# Patient Record
Sex: FEMALE | Race: WHITE | NOT HISPANIC OR LATINO | Employment: OTHER | URBAN - METROPOLITAN AREA
[De-identification: names, ages, dates, MRNs, and addresses within clinical notes are randomized per-mention and may not be internally consistent; named-entity substitution may affect disease eponyms.]

---

## 2017-01-31 DIAGNOSIS — F33.1 MAJOR DEPRESSIVE DISORDER, RECURRENT EPISODE, MODERATE (H): Primary | ICD-10-CM

## 2017-01-31 NOTE — TELEPHONE ENCOUNTER
Bupropion XL 150mg       Last Written Prescription Date: 12/07/2016 #90 x 1  Last filled 01/04/2017    Last Office Visit with [facility], [facility] or  Health prescribing provider:  03/22/2016 WANDY Castro   Next 5 appointments (look out 90 days)     Mar 27, 2017 10:00 AM   SHORT with GUSTAVO Santiago CNP   [facility] ([facility])    [address] [address] [phone]                   Last PHQ-9 score on record=   PHQ-9 SCORE 10/9/2016   Total Score -   Total Score MyChart 4 (Minimal depression)   Total Score -       No results found for this basename: ast  No results found for this basename: alt

## 2017-02-01 RX ORDER — BUPROPION HYDROCHLORIDE 150 MG/1
450 TABLET ORAL EVERY MORNING
Qty: 90 TABLET | Refills: 1 | Status: SHIPPED | OUTPATIENT
Start: 2017-02-01 | End: 2017-03-30

## 2017-02-01 NOTE — TELEPHONE ENCOUNTER
Prescription approved per [facility] Refill Protocol or patient Primary care provider (PCP)  MEDARDO Jose RN/Nic Calvin

## 2017-02-06 ENCOUNTER — TELEPHONE (OUTPATIENT)
Dept: FAMILY MEDICINE | Facility: CLINIC | Age: 62
End: 2017-02-06

## 2017-03-27 ENCOUNTER — OFFICE VISIT (OUTPATIENT)
Dept: FAMILY MEDICINE | Facility: CLINIC | Age: 62
End: 2017-03-27
Payer: COMMERCIAL

## 2017-03-27 VITALS
HEART RATE: 72 BPM | DIASTOLIC BLOOD PRESSURE: 80 MMHG | BODY MASS INDEX: 29.7 KG/M2 | TEMPERATURE: 98.3 F | SYSTOLIC BLOOD PRESSURE: 128 MMHG | HEIGHT: 66 IN | WEIGHT: 184.8 LBS

## 2017-03-27 DIAGNOSIS — Z01.419 ENCOUNTER FOR GYNECOLOGICAL EXAMINATION WITHOUT ABNORMAL FINDING: Primary | ICD-10-CM

## 2017-03-27 DIAGNOSIS — Z12.11 COLON CANCER SCREENING: ICD-10-CM

## 2017-03-27 DIAGNOSIS — Z11.59 NEED FOR HEPATITIS C SCREENING TEST: ICD-10-CM

## 2017-03-27 DIAGNOSIS — E55.9 VITAMIN D DEFICIENCY: ICD-10-CM

## 2017-03-27 DIAGNOSIS — E03.4 HYPOTHYROIDISM DUE TO ACQUIRED ATROPHY OF THYROID: ICD-10-CM

## 2017-03-27 DIAGNOSIS — D50.8 OTHER IRON DEFICIENCY ANEMIA: ICD-10-CM

## 2017-03-27 DIAGNOSIS — Z13.6 CARDIOVASCULAR SCREENING; LDL GOAL LESS THAN 160: ICD-10-CM

## 2017-03-27 LAB
ANION GAP SERPL CALCULATED.3IONS-SCNC: 4 MMOL/L (ref 3–14)
BUN SERPL-MCNC: 20 MG/DL (ref 7–30)
CALCIUM SERPL-MCNC: 9.2 MG/DL (ref 8.5–10.1)
CHLORIDE SERPL-SCNC: 104 MMOL/L (ref 94–109)
CHOLEST SERPL-MCNC: 251 MG/DL
CO2 SERPL-SCNC: 29 MMOL/L (ref 20–32)
CREAT SERPL-MCNC: 0.93 MG/DL (ref 0.52–1.04)
FERRITIN SERPL-MCNC: 65 NG/ML (ref 8–252)
GFR SERPL CREATININE-BSD FRML MDRD: 61 ML/MIN/1.7M2
GLUCOSE SERPL-MCNC: 72 MG/DL (ref 70–99)
HDLC SERPL-MCNC: 82 MG/DL
LDLC SERPL CALC-MCNC: 151 MG/DL
NONHDLC SERPL-MCNC: 169 MG/DL
POTASSIUM SERPL-SCNC: 4.5 MMOL/L (ref 3.4–5.3)
SODIUM SERPL-SCNC: 137 MMOL/L (ref 133–144)
TRIGL SERPL-MCNC: 89 MG/DL
TSH SERPL DL<=0.05 MIU/L-ACNC: 2.62 MU/L (ref 0.4–4)

## 2017-03-27 PROCEDURE — 36415 COLL VENOUS BLD VENIPUNCTURE: CPT | Performed by: NURSE PRACTITIONER

## 2017-03-27 PROCEDURE — 84443 ASSAY THYROID STIM HORMONE: CPT | Performed by: NURSE PRACTITIONER

## 2017-03-27 PROCEDURE — 99396 PREV VISIT EST AGE 40-64: CPT | Performed by: NURSE PRACTITIONER

## 2017-03-27 PROCEDURE — 82274 ASSAY TEST FOR BLOOD FECAL: CPT | Performed by: NURSE PRACTITIONER

## 2017-03-27 PROCEDURE — 87624 HPV HI-RISK TYP POOLED RSLT: CPT | Performed by: NURSE PRACTITIONER

## 2017-03-27 PROCEDURE — G0145 SCR C/V CYTO,THINLAYER,RESCR: HCPCS | Performed by: NURSE PRACTITIONER

## 2017-03-27 PROCEDURE — 80048 BASIC METABOLIC PNL TOTAL CA: CPT | Performed by: NURSE PRACTITIONER

## 2017-03-27 PROCEDURE — 80061 LIPID PANEL: CPT | Performed by: NURSE PRACTITIONER

## 2017-03-27 PROCEDURE — 82728 ASSAY OF FERRITIN: CPT | Performed by: NURSE PRACTITIONER

## 2017-03-27 RX ORDER — LEVOTHYROXINE SODIUM 88 UG/1
88 TABLET ORAL DAILY
Qty: 90 TABLET | Refills: 3 | Status: SHIPPED | OUTPATIENT
Start: 2017-03-27 | End: 2018-03-22

## 2017-03-27 NOTE — NURSING NOTE
"Chief Complaint   Patient presents with     Physical       Initial /80 (BP Location: Left arm, Patient Position: Chair, Cuff Size: Adult Regular)  Pulse 72  Temp 98.3  F (36.8  C) (Tympanic)  Ht 5' 5.5\" (1.664 m)  Wt 184 lb 12.8 oz (83.8 kg)  BMI 30.28 kg/m2 Estimated body mass index is 30.28 kg/(m^2) as calculated from the following:    Height as of this encounter: 5' 5.5\" (1.664 m).    Weight as of this encounter: 184 lb 12.8 oz (83.8 kg).  Medication Reconciliation: complete     Christie Mckinnon CMA (AAMA)      "

## 2017-03-27 NOTE — MR AVS SNAPSHOT
After Visit Summary   3/27/2017    Enid Sexton    MRN: [id number]           Patient Information     Date Of Birth          1955        Visit Information        Provider Department      3/27/2017 10:00 AM Yuli Castro APRN [facility]        Today's Diagnoses     Encounter for gynecological examination without abnormal finding    -  1    CARDIOVASCULAR SCREENING; LDL GOAL LESS THAN 160        Colon cancer screening        Need for hepatitis C screening test        Vitamin D deficiency        Other iron deficiency anemia        Hypothyroidism due to acquired atrophy of thyroid          Care Instructions      Preventive Health Recommendations  Female Ages 50 - 64    Yearly exam: See your health care provider every year in order to  o Review health changes.   o Discuss preventive care.    o Review your medicines if your doctor has prescribed any.      Get a Pap test every three years (unless you have an abnormal result and your provider advises testing more often).    If you get Pap tests with HPV test, you only need to test every 5 years, unless you have an abnormal result.     You do not need a Pap test if your uterus was removed (hysterectomy) and you have not had cancer.    You should be tested each year for STDs (sexually transmitted diseases) if you're at risk.     Have a mammogram every 1 to 2 years.    Have a colonoscopy at age 50, or have a yearly FIT test (stool test). These exams screen for colon cancer.      Have a cholesterol test every 5 years, or more often if advised.    Have a diabetes test (fasting glucose) every three years. If you are at risk for diabetes, you should have this test more often.     If you are at risk for osteoporosis (brittle bone disease), think about having a bone density scan (DEXA).    Shots: Get a flu shot each year. Get a tetanus shot every 10 years.    Nutrition:     Eat at least 5 servings of fruits and vegetables each  day.    Eat whole-grain bread, whole-wheat pasta and brown rice instead of white grains and rice.    Talk to your provider about Calcium and Vitamin D.     Lifestyle    Exercise at least 150 minutes a week (30 minutes a day, 5 days a week). This will help you control your weight and prevent disease.    Limit alcohol to one drink per day.    No smoking.     Wear sunscreen to prevent skin cancer.     See your dentist every six months for an exam and cleaning.    See your eye doctor every 1 to 2 years.      Do the knee exercises given    wear the knee brace during the day and off at bed time   Get some over the counter  Asper Cream and massage in the knee 2-4 times per day     Stay well hydrated - urine should be clear.      Start an exercise program .      No change with medication               Follow-ups after your visit        Future tests that were ordered for you today     Open Future Orders        Priority Expected Expires Ordered    Fecal colorectal cancer screen FIT Routine 4/17/2017 6/19/2017 3/27/2017    **Hepatitis C Screen Reflex to RNA FUTURE anytime Routine 3/27/2017 3/27/2018 3/27/2017            Who to contact     Normal or non-critical lab and imaging results will be communicated to you by [employer], letter or phone within 4 business days after the clinic has received the results. If you do not hear from us within 7 days, please contact the clinic through [employer] or phone. If you have a critical or abnormal lab result, we will notify you by phone as soon as possible.  Submit refill requests through [employer] or call your pharmacy and they will forward the refill request to us. Please allow 3 business days for your refill to be completed.          If you need to speak with a  for additional information , please call: [phone]           Additional Information About Your Visit        [employer] Information     [employer] gives you secure access to your electronic health record. If you see a  "primary care provider, you can also send messages to your care team and make appointments. If you have questions, please call your primary care clinic.  If you do not have a primary care provider, please call [phone] and they will assist you.        Care EveryWhere ID     This is your Care EveryWhere ID. This could be used by other organizations to access your [place] medical records  [id number]        Your Vitals Were     Pulse Temperature Height BMI (Body Mass Index)          72 98.3  F (36.8  C) (Tympanic) 5' 5.5\" (1.664 m) 30.28 kg/m2         Blood Pressure from Last 3 Encounters:   03/27/17 128/80   03/22/16 120/78   04/30/15 144/86    Weight from Last 3 Encounters:   03/27/17 184 lb 12.8 oz (83.8 kg)   04/06/16 171 lb (77.6 kg)   03/22/16 171 lb (77.6 kg)              We Performed the Following     Basic metabolic panel  (Ca, Cl, CO2, Creat, Gluc, K, Na, BUN)     Ferritin     HPV High Risk Types DNA Cervical     LIPID REFLEX TO DIRECT LDL PANEL     Pap imaged thin layer screen with HPV - recommended age 30 - 65     TSH        Primary Care Provider Office Phone # Fax #    GUSTAVO Santiago [facility] [phone]       [facility] [address] [place] [address]        Thank you!     Thank you for choosing [facility]  for your care. Our goal is always to provide you with excellent care. Hearing back from our patients is one way we can continue to improve our services. Please take a few minutes to complete the written survey that you may receive in the mail after your visit with us. Thank you!             Your Updated Medication List - Protect others around you: Learn how to safely use, store and throw away your medicines at www.disposemymeds.org.          This list is accurate as of: 3/27/17 10:46 AM.  Always use your most recent med list.                   Brand Name Dispense Instructions for use    aspirin 325 MG tablet      Take by mouth daily       " buPROPion 150 MG 24 hr tablet    WELLBUTRIN XL    90 tablet    Take 3 tablets (450 mg) by mouth every morning DUE for yearly office visit March 2017 for further refill plz make appt       CALCIUM CITRATE + D PO      Take by mouth as needed 2 times per week       citalopram 40 MG tablet    celeXA    90 tablet    Take 1 tablet (40 mg) by mouth daily Needs to be seen  Before next refill       DAILY MULTIVITAMIN PO      Take by mouth daily Reported on 3/27/2017       IRON PO      Take by mouth daily       levothyroxine 88 MCG tablet    SYNTHROID/LEVOTHROID    90 tablet    Take 1 tablet (88 mcg) by mouth daily       vitamin B complex with vitamin C Tabs tablet      Take 1 tablet by mouth daily

## 2017-03-27 NOTE — LETTER
My Depression Action Plan  Name: Enid Sexton   Date of Birth 1955  Date: 3/27/2017    My doctor: Yuli Castro   My clinic: [address] [id number] [phone]          GREEN    ZONE   Good Control    What it looks like:     Things are going generally well. You have normal up s and down s. You may even feel depressed from time to time, but bad moods usually last less than a day.   What you need to do:  1. Continue to care for yourself (see self care plan)  2. Check your depression survival kit and update it as needed  3. Follow your physician s recommendations including any medication.  4. Do not stop taking medication unless you consult with your physician first.           YELLOW         ZONE Getting Worse    What it looks like:     Depression is starting to interfere with your life.     It may be hard to get out of bed; you may be starting to isolate yourself from others.    Symptoms of depression are starting to last most all day and this has happened for several days.     You may have suicidal thoughts but they are not constant.   What you need to do:     1. Call your care team, your response to treatment will improve if you keep your care team informed of your progress. Yellow periods are signs an adjustment may need to be made.     2. Continue your self-care, even if you have to fake it!    3. Talk to someone in your support network    4. Open up your depression survival kit           RED    ZONE Medical Alert - Get Help    What it looks like:     Depression is seriously interfering with your life.     You may experience these or other symptoms: You can t get out of bed most days, can t work or engage in other necessary activities, you have trouble taking care of basic hygiene, or basic responsibilities, thoughts of suicide or death that will not go away, self-injurious behavior.     What you need to do:  1. Call your care team  and request a same-day appointment. If they are not available (weekends or after hours) call your local crisis line, emergency room or 911.      Electronically signed by: Christie Mckinnon, March 27, 2017    Depression Self Care Plan / Survival Kit    Self-Care for Depression  Here s the deal. Your body and mind are really not as separate as most people think.  What you do and think affects how you feel and how you feel influences what you do and think. This means if you do things that people who feel good do, it will help you feel better.  Sometimes this is all it takes.  There is also a place for medication and therapy depending on how severe your depression is, so be sure to consult with your medical provider and/ or Behavioral Health Consultant if your symptoms are worsening or not improving.     In order to better manage my stress, I will:    Exercise  Get some form of exercise, every day. This will help reduce pain and release endorphins, the  feel good  chemicals in your brain. This is almost as good as taking antidepressants!  This is not the same as joining a gym and then never going! (they count on that by the way ) It can be as simple as just going for a walk or doing some gardening, anything that will get you moving.      Hygiene   Maintain good hygiene (Get out of bed in the morning, Make your bed, Brush your teeth, Take a shower, and Get dressed like you were going to work, even if you are unemployed).  If your clothes don't fit try to get ones that do.    Diet  I will strive to eat foods that are good for me, drink plenty of water, and avoid excessive sugar, caffeine, alcohol, and other mood-altering substances.  Some foods that are helpful in depression are: complex carbohydrates, B vitamins, flaxseed, fish or fish oil, fresh fruits and vegetables.    Psychotherapy  I agree to participate in Individual Therapy (if recommended).    Medication  If prescribed medications, I agree to take them.  Missing  doses can result in serious side effects.  I understand that drinking alcohol, or other illicit drug use, may cause potential side effects.  I will not stop my medication abruptly without first discussing it with my provider.    Staying Connected With Others  I will stay in touch with my friends, family members, and my primary care provider/team.    Use your imagination  Be creative.  We all have a creative side; it doesn t matter if it s oil painting, sand castles, or mud pies! This will also kick up the endorphins.    Witness Beauty  (AKA stop and smell the roses) Take a look outside, even in mid-winter. Notice colors, textures. Watch the squirrels and birds.     Service to others  Be of service to others.  There is always someone else in need.  By helping others we can  get out of ourselves  and remember the really important things.  This also provides opportunities for practicing all the other parts of the program.    Humor  Laugh and be silly!  Adjust your TV habits for less news and crime-drama and more comedy.    Control your stress  Try breathing deep, massage therapy, biofeedback, and meditation. Find time to relax each day.     My support system    Clinic Contact:  Phone number:    Contact 1:  Phone number:    Contact 2:  Phone number:    [place]/:  Phone number:    Therapist:  Phone number:    Local crisis center:    Phone number:    Other community support:  Phone number:

## 2017-03-27 NOTE — PROGRESS NOTES
SUBJECTIVE:     CC: Enid Sexton is an 61 year old woman who presents for preventive health visit.     Answers for HPI/ROS submitted by the patient on 3/24/2017   Annual Exam:  Getting at least 3 servings of Calcium per day:: NO  Bi-annual eye exam:: NO  Dental care twice a year:: NO  Sleep apnea or symptoms of sleep apnea:: Daytime drowsiness  Diet:: Regular (no restrictions)  Frequency of exercise:: None  Taking medications regularly:: Yes  Medication side effects:: None  Q1: Little interest or pleasure in doing things: 1=Several days  Q2: Feeling down, depressed or hopeless: 0=Not at all  PHQ-2 Score: 1    *Is not fasting.     *Patient declining all cancer screenings besides pap.    *Left knee pain - Was carrying a bookcase about 3 weeks ago and has had pain since. Feels like the pain is not going away and that the knee is getting stiffer. Has tried an ace wrap and this did not help much. States that when the pain first started it would get better throughout the day but now the pain lasts.    The outer part of the knee is better but the inner knee     Today's PHQ-2 Score:   PHQ-2 ( 1999 [employer]) 3/24/2017 3/20/2016   Q1: Little interest or pleasure in doing things - -   Q2: Feeling down, depressed or hopeless - -   PHQ-2 Score - -   Little interest or pleasure in doing things Several days Several days   Feeling down, depressed or hopeless Not at all Several days   PHQ-2 Score 1 2       Abuse: Current or Past(Physical, Sexual or Emotional)- No  Do you feel safe in your environment - Yes    Social History   Substance Use Topics     Smoking status: Former Smoker     Smokeless tobacco: Never Used     Alcohol use No      Comment: rare     The patient does not drink >3 drinks per day nor >7 drinks per week.    Recent Labs   Lab Test  03/22/16   1041  03/05/15   1210  02/27/14   0928   CHOL  279*  227*  252*   HDL  92  78  76   LDL  170*  122  160*   TRIG  86  137  80   CHOLHDLRATIO   --   2.9  3.0   NHDL  187*   --     --        Reviewed orders with patient.  Reviewed health maintenance and updated orders accordingly - Yes    Mammo Decision Support:  Patient over age 50, mutual decision to screen reflected in health maintenance.    Pertinent mammograms are reviewed under the imaging tab.  History of abnormal Pap smear: NO - age 30- 65 PAP every 3 years recommended    Reviewed and updated as needed this visit by clinical staff  Tobacco  Allergies  Meds  Med Hx  Surg Hx  Fam Hx  Soc Hx        Reviewed and updated as needed this visit by Provider        Is trying to find full time work, is now cleaning houses for  4 people   Left knee pain - no known injury    Does have a black eye - right eye- bent over and hit her face on a chair.      ROS:  C: NEGATIVE for fever, chills, POSITIVE change in weight - weight gain   I: NEGATIVE for worrisome rashes, moles or lesions  E: NEGATIVE for vision changes or irritation  EYES: NEGATIVE for vision changes or irritation and current with eye exam , does have right black eye after bending over   ENT: NEGATIVE for ear, mouth and throat problems  R: NEGATIVE for significant cough or SOB  B: NEGATIVE for masses, tenderness or discharge  CV: NEGATIVE for chest pain, palpitations or peripheral edema  GI: NEGATIVE for nausea, abdominal pain, heartburn, or change in bowel habits  : NEGATIVE for unusual urinary or vaginal symptoms. No vaginal bleeding.  MUSCULOSKELETAL:NEGATIVE for significant arthralgias or myalgia and POSITIVE  for joint pain left knee pain after trying to move a bookcase   N: NEGATIVE for weakness, dizziness or paresthesias  E: NEGATIVE for temperature intolerance, skin/hair changes  H: NEGATIVE for bleeding problems  PSYCHIATRIC: NEGATIVE for changes in mood or affect     Problem list, Medication list, Allergies, and Medical/Social/Surgical histories reviewed in [facility] and updated as appropriate.  Labs reviewed in EPIC  BP Readings from Last 3 Encounters:   03/27/17 128/80    03/22/16 120/78   04/30/15 144/86    Wt Readings from Last 3 Encounters:   03/27/17 184 lb 12.8 oz (83.8 kg)   04/06/16 171 lb (77.6 kg)   03/22/16 171 lb (77.6 kg)                  Patient Active Problem List   Diagnosis     CARDIOVASCULAR SCREENING; LDL GOAL LESS THAN 160     Moderate major depression (H)     Hypothyroidism     Vitamin D deficiency     Wrist pain     Advanced directives, counseling/discussion     Iron deficiency anemia     Ganglion cyst     Past Surgical History:   Procedure Laterality Date     ABDOMEN SURGERY  laperoscopy    exploratory     BIOPSY  10 years?    after pap smear - was nothing there     EYE SURGERY  lasik surgery     lapraoscopy         Social History   Substance Use Topics     Smoking status: Former Smoker     Smokeless tobacco: Never Used     Alcohol use No      Comment: rare     Family History   Problem Relation Age of Onset     Hypertension Mother      Other Cancer Mother      CANCER Father      bladder cancer/smoker     Other Cancer Father      Depression Niece          Current Outpatient Prescriptions   Medication Sig Dispense Refill     vitamin B complex with vitamin C (VITAMIN  B COMPLEX) TABS tablet Take 1 tablet by mouth daily       buPROPion (WELLBUTRIN XL) 150 MG 24 hr tablet Take 3 tablets (450 mg) by mouth every morning DUE for yearly office visit March 2017 for further refill plz make appt 90 tablet 1     citalopram (CELEXA) 40 MG tablet Take 1 tablet (40 mg) by mouth daily Needs to be seen  Before next refill 90 tablet 1     levothyroxine (SYNTHROID, LEVOTHROID) 88 MCG tablet Take 1 tablet (88 mcg) by mouth daily 90 tablet 3     aspirin 325 MG tablet Take by mouth daily       Multiple Vitamin (DAILY MULTIVITAMIN PO) Take by mouth daily Reported on 3/27/2017       IRON PO Take by mouth daily       Calcium Citrate-Vitamin D (CALCIUM CITRATE + D PO) Take by mouth as needed 2 times per week       No Known Allergies  OBJECTIVE:     /80 (BP Location: Left arm,  "Patient Position: Chair, Cuff Size: Adult Regular)  Pulse 72  Temp 98.3  F (36.8  C) (Tympanic)  Ht 5' 5.5\" (1.664 m)  Wt 184 lb 12.8 oz (83.8 kg)  BMI 30.28 kg/m2  EXAM:  GENERAL APPEARANCE: healthy, alert and no distress  EYES: Eyes grossly normal to inspection, PERRL and conjunctivae and sclerae normal  HENT: ear canals and TM's normal, nose and mouth without ulcers or lesions, oropharynx clear and oral mucous membranes moist  NECK: no adenopathy, no asymmetry, masses, or scars and thyroid normal to palpation  RESP: lungs clear to auscultation - no rales, rhonchi or wheezes  BREAST: normal without masses, tenderness or nipple discharge and no palpable axillary masses or adenopathy  CV: regular rate and rhythm, normal S1 S2, no S3 or S4, no murmur, click or rub, no peripheral edema and peripheral pulses strong  ABDOMEN: soft, nontender, no hepatosplenomegaly, no masses and bowel sounds normal   (female): normal female external genitalia, normal urethral meatus, vaginal mucosal atrophy noted, normal cervix, adnexae, and uterus without masses or abnormal discharge  MS: gait is age appropriate without ataxia, left knee   Inspection: AP/lateral alignment normal, small effusion, No quad atrophy  Tender: MCL  Non-tender: lateral patellar facet, medial patellar facet, inferior pole patella, patella tendon, quadriceps insertion, LCL, lateral joint line, medial joint line, medial tibial plateau, lateral tibial plateau  Active Range of Motion: decreased flexion  60 degrees, pain with flexion, full extension, no pain with extension  Strength: quad  5-/5, Hamstrings  5-/5 and Gastroc  5-/5  Special tests: negative Lachman's test, negative posterior drawer  SKIN: no suspicious lesions or rashes  NEURO: Normal strength and tone, sensory exam grossly normal, mentation intact and speech normal  PSYCH: mentation appears normal and affect normal/bright    ASSESSMENT/PLAN:       ASSESSMENT/PLAN:      ICD-10-CM    1. Encounter " for gynecological examination without abnormal finding Z01.419 Pap imaged thin layer screen with HPV - recommended age 30 - 65     HPV High Risk Types DNA Cervical   2. CARDIOVASCULAR SCREENING; LDL GOAL LESS THAN 160 Z13.6 LIPID REFLEX TO DIRECT LDL PANEL     Basic metabolic panel  (Ca, Cl, CO2, Creat, Gluc, K, Na, BUN)   3. Colon cancer screening Z12.11 Fecal colorectal cancer screen FIT   4. Need for hepatitis C screening test Z11.59 **Hepatitis C Screen Reflex to RNA FUTURE anytime   5. Vitamin D deficiency E55.9    6. Other iron deficiency anemia D50.8 Ferritin   7. Hypothyroidism due to acquired atrophy of thyroid E03.4 TSH       Patient Instructions     Preventive Health Recommendations  Female Ages 50 - 64    Yearly exam: See your health care provider every year in order to  o Review health changes.   o Discuss preventive care.    o Review your medicines if your doctor has prescribed any.      Get a Pap test every three years (unless you have an abnormal result and your provider advises testing more often).    If you get Pap tests with HPV test, you only need to test every 5 years, unless you have an abnormal result.     You do not need a Pap test if your uterus was removed (hysterectomy) and you have not had cancer.    You should be tested each year for STDs (sexually transmitted diseases) if you're at risk.     Have a mammogram every 1 to 2 years.    Have a colonoscopy at age 50, or have a yearly FIT test (stool test). These exams screen for colon cancer.      Have a cholesterol test every 5 years, or more often if advised.    Have a diabetes test (fasting glucose) every three years. If you are at risk for diabetes, you should have this test more often.     If you are at risk for osteoporosis (brittle bone disease), think about having a bone density scan (DEXA).    Shots: Get a flu shot each year. Get a tetanus shot every 10 years.    Nutrition:     Eat at least 5 servings of fruits and vegetables each  "day.    Eat whole-grain bread, whole-wheat pasta and brown rice instead of white grains and rice.    Talk to your provider about Calcium and Vitamin D.     Lifestyle    Exercise at least 150 minutes a week (30 minutes a day, 5 days a week). This will help you control your weight and prevent disease.    Limit alcohol to one drink per day.    No smoking.     Wear sunscreen to prevent skin cancer.     See your dentist every six months for an exam and cleaning.    See your eye doctor every 1 to 2 years.      Do the knee exercises given    wear the knee brace during the day and off at bed time   Get some over the counter  Asper Cream and massage in the knee 2-4 times per day     Stay well hydrated - urine should be clear.      Start an exercise program .      No change with medication                   COUNSELING:   Reviewed preventive health counseling, as reflected in patient instructions       Regular exercise       Healthy diet/nutrition       Vision screening       Osteoporosis Prevention/Bone Health       Colon cancer screening       Consider Hep C screening for patients born between 1945 and 1965       Advance Care Planning         reports that she has quit smoking. She has never used smokeless tobacco.    Estimated body mass index is 30.28 kg/(m^2) as calculated from the following:    Height as of this encounter: 5' 5.5\" (1.664 m).    Weight as of this encounter: 184 lb 12.8 oz (83.8 kg).   Weight management plan: Discussed healthy diet and exercise guidelines and patient will follow up in 6 months in clinic to re-evaluate.    Counseling Resources:  ATP IV Guidelines  Pooled Cohorts Equation Calculator  Breast Cancer Risk Calculator  FRAX Risk Assessment  ICSI Preventive Guidelines  Dietary Guidelines for Americans, 2010  USDA's MyPlate  ASA Prophylaxis  Lung CA Screening    ORQUIDEA STOLL NP, APRN CNP  [facility]"

## 2017-03-27 NOTE — PATIENT INSTRUCTIONS
Preventive Health Recommendations  Female Ages 50 - 64    Yearly exam: See your health care provider every year in order to  o Review health changes.   o Discuss preventive care.    o Review your medicines if your doctor has prescribed any.      Get a Pap test every three years (unless you have an abnormal result and your provider advises testing more often).    If you get Pap tests with HPV test, you only need to test every 5 years, unless you have an abnormal result.     You do not need a Pap test if your uterus was removed (hysterectomy) and you have not had cancer.    You should be tested each year for STDs (sexually transmitted diseases) if you're at risk.     Have a mammogram every 1 to 2 years.    Have a colonoscopy at age 50, or have a yearly FIT test (stool test). These exams screen for colon cancer.      Have a cholesterol test every 5 years, or more often if advised.    Have a diabetes test (fasting glucose) every three years. If you are at risk for diabetes, you should have this test more often.     If you are at risk for osteoporosis (brittle bone disease), think about having a bone density scan (DEXA).    Shots: Get a flu shot each year. Get a tetanus shot every 10 years.    Nutrition:     Eat at least 5 servings of fruits and vegetables each day.    Eat whole-grain bread, whole-wheat pasta and brown rice instead of white grains and rice.    Talk to your provider about Calcium and Vitamin D.     Lifestyle    Exercise at least 150 minutes a week (30 minutes a day, 5 days a week). This will help you control your weight and prevent disease.    Limit alcohol to one drink per day.    No smoking.     Wear sunscreen to prevent skin cancer.     See your dentist every six months for an exam and cleaning.    See your eye doctor every 1 to 2 years.      Do the knee exercises given    wear the knee brace during the day and off at bed time   Get some over the counter  Asper Cream and massage in the knee 2-4 times  per day     Stay well hydrated - urine should be clear.      Start an exercise program .      No change with medication

## 2017-03-28 ASSESSMENT — PATIENT HEALTH QUESTIONNAIRE - PHQ9: SUM OF ALL RESPONSES TO PHQ QUESTIONS 1-9: 2

## 2017-03-29 LAB
COPATH REPORT: NORMAL
PAP: NORMAL

## 2017-03-30 ENCOUNTER — TELEPHONE (OUTPATIENT)
Dept: FAMILY MEDICINE | Facility: CLINIC | Age: 62
End: 2017-03-30

## 2017-03-30 DIAGNOSIS — R19.5 POSITIVE FIT (FECAL IMMUNOCHEMICAL TEST): Primary | ICD-10-CM

## 2017-03-30 DIAGNOSIS — Z12.11 COLON CANCER SCREENING: ICD-10-CM

## 2017-03-30 DIAGNOSIS — F33.1 MAJOR DEPRESSIVE DISORDER, RECURRENT EPISODE, MODERATE (H): ICD-10-CM

## 2017-03-30 LAB
FINAL DIAGNOSIS: NORMAL
HEMOCCULT STL QL IA: POSITIVE
HPV HR 12 DNA CVX QL NAA+PROBE: NEGATIVE
HPV16 DNA SPEC QL NAA+PROBE: NEGATIVE
HPV18 DNA SPEC QL NAA+PROBE: NEGATIVE
SPECIMEN DESCRIPTION: NORMAL

## 2017-03-30 NOTE — TELEPHONE ENCOUNTER
Wellbutrin XL 150mg       Last Written Prescription Date: 02/01/2017  #90 x 1  Last filled 02/28/2017  Last Office Visit with [facility], [facility] or [facility] prescribing provider:  03/29/2017 WANDY Castro        Last PHQ-9 score on record=   PHQ-9 SCORE 3/27/2017   Total Score -   Total Score MyChart -   Total Score 2       No results found for: AST  No results found for: ALT

## 2017-03-31 RX ORDER — BUPROPION HYDROCHLORIDE 150 MG/1
450 TABLET ORAL EVERY MORNING
Qty: 270 TABLET | Refills: 1 | Status: SHIPPED | OUTPATIENT
Start: 2017-03-31 | End: 2017-06-26

## 2017-03-31 NOTE — TELEPHONE ENCOUNTER
Please call pt that her FIT test did come back positive and I did put in an order for a colonoscopy .  I did also send her a mychart note with a copy of the referral to GI  Thank you  Yuli

## 2017-03-31 NOTE — TELEPHONE ENCOUNTER
Called patient and informed her of positive FIT test results and Yuli's recommendations to follow up with a colonoscopy. Did provide patient with the number to schedule the procedure in [place]. It looks like a referral was placed for consult ONLY, order for colonoscopy placed.  Christie Mckinnon CMA ([facility])

## 2017-04-25 ENCOUNTER — RADIANT APPOINTMENT (OUTPATIENT)
Dept: GENERAL RADIOLOGY | Facility: CLINIC | Age: 62
End: 2017-04-25
Attending: NURSE PRACTITIONER
Payer: COMMERCIAL

## 2017-04-25 ENCOUNTER — OFFICE VISIT (OUTPATIENT)
Dept: FAMILY MEDICINE | Facility: CLINIC | Age: 62
End: 2017-04-25
Payer: COMMERCIAL

## 2017-04-25 VITALS
TEMPERATURE: 98.6 F | HEART RATE: 72 BPM | DIASTOLIC BLOOD PRESSURE: 80 MMHG | WEIGHT: 186.2 LBS | HEIGHT: 66 IN | SYSTOLIC BLOOD PRESSURE: 118 MMHG | BODY MASS INDEX: 29.92 KG/M2

## 2017-04-25 DIAGNOSIS — G89.29 CHRONIC PAIN OF LEFT KNEE: ICD-10-CM

## 2017-04-25 DIAGNOSIS — M25.562 CHRONIC PAIN OF LEFT KNEE: Primary | ICD-10-CM

## 2017-04-25 DIAGNOSIS — M25.562 CHRONIC PAIN OF LEFT KNEE: ICD-10-CM

## 2017-04-25 DIAGNOSIS — G89.29 CHRONIC PAIN OF LEFT KNEE: Primary | ICD-10-CM

## 2017-04-25 PROCEDURE — 73560 X-RAY EXAM OF KNEE 1 OR 2: CPT | Mod: LT

## 2017-04-25 PROCEDURE — 99213 OFFICE O/P EST LOW 20 MIN: CPT | Performed by: NURSE PRACTITIONER

## 2017-04-25 RX ORDER — PREDNISONE 10 MG/1
TABLET ORAL
Qty: 30 TABLET | Refills: 0 | Status: SHIPPED | OUTPATIENT
Start: 2017-04-25 | End: 2018-03-22

## 2017-04-25 ASSESSMENT — PAIN SCALES - GENERAL: PAINLEVEL: MODERATE PAIN (5)

## 2017-04-25 NOTE — MR AVS SNAPSHOT
After Visit Summary   4/25/2017    Enid Sexton    MRN: [id number]           Patient Information     Date Of Birth          1955        Visit Information        Provider Department      4/25/2017 10:40 AM Yuli Castro APRN [facility]        Today's Diagnoses     Chronic pain of left knee    -  1      Care Instructions    Continue to ice your left knee   Massage in  Asper Cream     Go to Physical therapy for the knee pain      Take the prednisone to decrease the swelling         Follow-ups after your visit        Additional Services     [facility] PT, HAND, AND CHIROPRACTIC REFERRAL       **This order will print in the [facility] Scheduling Office**    Physical Therapy, Hand Therapy and Chiropractic Care are available through:    *[facility]  *[facility]  *[facility]    Call one number to schedule at any of the above locations: [phone].    Your provider has referred you to: Physical Therapy at [facility] or [facility]    Indication/Reason for Referral: left knee pain   Onset of Illness: 6 + weeks ago   Therapy Orders: Evaluate and Treat  Special Programs: None  Special Request: None    Lanre De Dios      Additional Comments for the Therapist or Chiropractor:     Please be aware that coverage of these services is subject to the terms and limitations of your health insurance plan.  Call member services at your health plan with any benefit or coverage questions.      Please bring the following to your appointment:    *Your personal calendar for scheduling future appointments  *Comfortable clothing                  Who to contact     Normal or non-critical lab and imaging results will be communicated to you by MyChart, letter or phone within 4 business days after the clinic has received the results. If you do not hear from us within 7 days, please contact the clinic through MyChart or phone. If you have a critical or abnormal lab  "result, we will notify you by phone as soon as possible.  Submit refill requests through [employer] or call your pharmacy and they will forward the refill request to us. Please allow 3 business days for your refill to be completed.          If you need to speak with a  for additional information , please call: [phone]           Additional Information About Your Visit        [employer] Information     [employer] gives you secure access to your electronic health record. If you see a primary care provider, you can also send messages to your care team and make appointments. If you have questions, please call your primary care clinic.  If you do not have a primary care provider, please call [phone] and they will assist you.        Care EveryWhere ID     This is your Care EveryWhere ID. This could be used by other organizations to access your [facility] medical records  [id number]        Your Vitals Were     Pulse Temperature Height Breastfeeding? BMI (Body Mass Index)       72 98.6  F (37  C) (Tympanic) 5' 5.5\" (1.664 m) No 30.51 kg/m2        Blood Pressure from Last 3 Encounters:   04/25/17 118/80   03/27/17 128/80   03/22/16 120/78    Weight from Last 3 Encounters:   04/25/17 186 lb 3.2 oz (84.5 kg)   03/27/17 184 lb 12.8 oz (83.8 kg)   04/06/16 171 lb (77.6 kg)              We Performed the Following     [facility] PT, HAND, AND CHIROPRACTIC REFERRAL          Today's Medication Changes          These changes are accurate as of: 4/25/17 12:24 PM.  If you have any questions, ask your nurse or doctor.               Start taking these medicines.        Dose/Directions    predniSONE 10 MG tablet   Commonly known as:  DELTASONE   Used for:  Chronic pain of left knee   Started by:  Yuli Castro APRN CNP        40 mg for 3 days,   30 mg for 3 days ,  20 mg for 3 days , 10 mg for 3 days.   Quantity:  30 tablet   Refills:  0            Where to get your medicines      These medications were sent to [id number] - [place] [[facility] [place] - [address]  [address]  [place] [address]     Phone:  [phone]     predniSONE 10 MG tablet                Primary Care Provider Office Phone # Fax #    Yuli Castro APRALLYSON [facility] [phone]       [facility] [address] [place] [address]        Thank you!     Thank you for choosing [facility]  for your care. Our goal is always to provide you with excellent care. Hearing back from our patients is one way we can continue to improve our services. Please take a few minutes to complete the written survey that you may receive in the mail after your visit with us. Thank you!             Your Updated Medication List - Protect others around you: Learn how to safely use, store and throw away your medicines at www.disposemymeds.org.          This list is accurate as of: 4/25/17 12:24 PM.  Always use your most recent med list.                   Brand Name Dispense Instructions for use    aspirin 325 MG tablet      Take by mouth daily       buPROPion 150 MG 24 hr tablet    WELLBUTRIN XL    270 tablet    Take 3 tablets (450 mg) by mouth every morning       CALCIUM CITRATE + D PO      Take by mouth as needed 2 times per week       citalopram 40 MG tablet    celeXA    90 tablet    Take 1 tablet (40 mg) by mouth daily Needs to be seen  Before next refill       DAILY MULTIVITAMIN PO      Take by mouth daily Reported on 3/27/2017       IRON PO      Take by mouth daily       levothyroxine 88 MCG tablet    SYNTHROID/LEVOTHROID    90 tablet    Take 1 tablet (88 mcg) by mouth daily       predniSONE 10 MG tablet    DELTASONE    30 tablet    40 mg for 3 days,   30 mg for 3 days ,  20 mg for 3 days , 10 mg for 3 days.       vitamin B complex with vitamin C Tabs tablet      Take 1 tablet by mouth daily

## 2017-04-25 NOTE — PATIENT INSTRUCTIONS
Continue to ice your left knee   Massage in  Asper Cream     Go to Physical therapy for the knee pain      Take the prednisone to decrease the swelling

## 2017-04-25 NOTE — NURSING NOTE
"Chief Complaint   Patient presents with     RECHECK       Initial /80  Pulse 72  Temp 98.6  F (37  C) (Tympanic)  Ht 5' 5.5\" (1.664 m)  Wt 186 lb 3.2 oz (84.5 kg)  Breastfeeding? No  BMI 30.51 kg/m2 Estimated body mass index is 30.51 kg/(m^2) as calculated from the following:    Height as of this encounter: 5' 5.5\" (1.664 m).    Weight as of this encounter: 186 lb 3.2 oz (84.5 kg).  Medication Reconciliation: complete     Taya Doe CMA      "

## 2017-04-25 NOTE — PROGRESS NOTES
SUBJECTIVE:                                                    Enid Sexton is a 61 year old female who presents to clinic today for the following health issues:      Joint Pain     Onset: left knee pain      Description:   Location: left knee  Character: Sharp    Intensity: moderate    Progression of Symptoms: worse    Accompanying Signs & Symptoms:  Other symptoms: warmth and swelling   History:   Previous similar pain: No       Precipitating factors:   Trauma or overuse: YES- moving a bookcase down stairs     Alleviating factors:  Improved by: rest/inactivity, ice, stretching,         Therapies Tried and outcome: Asper creme, ice and exercises.         Left  knee pain     Problem list and histories reviewed & adjusted, as indicated.  Additional history: as documented    Patient Active Problem List   Diagnosis     CARDIOVASCULAR SCREENING; LDL GOAL LESS THAN 160     Hypothyroidism     Vitamin D deficiency     Wrist pain     Advanced directives, counseling/discussion     Iron deficiency anemia     Ganglion cyst     Positive FIT (fecal immunochemical test)     Past Surgical History:   Procedure Laterality Date     ABDOMEN SURGERY  laperoscopy    exploratory     BIOPSY  10 years?    after pap smear - was nothing there     EYE SURGERY  lasik surgery     lapraoscopy         Social History   Substance Use Topics     Smoking status: Former Smoker     Smokeless tobacco: Never Used     Alcohol use No      Comment: rare     Family History   Problem Relation Age of Onset     Hypertension Mother      Other Cancer Mother      CANCER Father      bladder cancer/smoker     Other Cancer Father      Depression Niece          Current Outpatient Prescriptions   Medication Sig Dispense Refill     buPROPion (WELLBUTRIN XL) 150 MG 24 hr tablet Take 3 tablets (450 mg) by mouth every morning 270 tablet 1     vitamin B complex with vitamin C (VITAMIN  B COMPLEX) TABS tablet Take 1 tablet by mouth daily       levothyroxine  "(SYNTHROID/LEVOTHROID) 88 MCG tablet Take 1 tablet (88 mcg) by mouth daily 90 tablet 3     citalopram (CELEXA) 40 MG tablet Take 1 tablet (40 mg) by mouth daily Needs to be seen  Before next refill 90 tablet 1     aspirin 325 MG tablet Take by mouth daily       Multiple Vitamin (DAILY MULTIVITAMIN PO) Take by mouth daily Reported on 3/27/2017       IRON PO Take by mouth daily       Calcium Citrate-Vitamin D (CALCIUM CITRATE + D PO) Take by mouth as needed 2 times per week       No Known Allergies  BP Readings from Last 3 Encounters:   04/25/17 118/80   03/27/17 128/80   03/22/16 120/78    Wt Readings from Last 3 Encounters:   04/25/17 186 lb 3.2 oz (84.5 kg)   03/27/17 184 lb 12.8 oz (83.8 kg)   04/06/16 171 lb (77.6 kg)                    Reviewed and updated as needed this visit by clinical staff  Allergies       Reviewed and updated as needed this visit by Provider         ROS:  C: NEGATIVE for fever, chills, change in weight  E/M: NEGATIVE for ear, mouth and throat problems  R: NEGATIVE for significant cough or SOB  CV: NEGATIVE for chest pain, palpitations or peripheral edema  MUSCULOSKELETAL: POSITIVE  for joint pain left knee pain.  Will hurt with walking   The pain started to after moving a very heavy bookcase and was pushing with her  Left knee   Has been doing exercises but that seems to be making the knee hurt worse.   Ice will help the knee when the ice is on . Once it is off the knee will hurt and over time the knee pain seems to be getting sharper         OBJECTIVE:                                                    /80  Pulse 72  Temp 98.6  F (37  C) (Tympanic)  Ht 5' 5.5\" (1.664 m)  Wt 186 lb 3.2 oz (84.5 kg)  Breastfeeding? No  BMI 30.51 kg/m2  Body mass index is 30.51 kg/(m^2).  GENERAL: healthy, alert and no distress  RESP: lungs clear to auscultation - no rales, rhonchi or wheezes  CV: regular rate and rhythm, normal S1 S2, no S3 or S4, no murmur, click or rub, no peripheral edema " "and peripheral pulses strong  MS: Inspection: AP/lateral alignment normal, No effusion, No quad atrophy  Tender: MCL, medial joint line  Non-tender: patella tendon, quadriceps insertion, LCL, lateral joint line, medial tibial plateau, lateral tibial plateau, distal IT band, prepatellar bursa  Active Range of Motion: decreased flexion  80 degrees, pain with flexion, no pain with extension  Strength: quad  5-/5, Hamstrings  5-/5 and Gastroc  5-/5  Special tests: positive Lachman's test  Doesn't want to do PT  Doesn't want a cortisone injection . Was requesting an antibiotic ( read that it could be bursitis and that antibiotics will correct that  ) did review with her that it is more inflammation       Diagnostic Test Results:  Knee x-ray  Looks good      ASSESSMENT/PLAN:                                                      ASSESSMENT/PLAN:      ICD-10-CM    1. Chronic pain of left knee M25.562 XR Knee Left 1/2 Views    G89.29 [facility], AND CHIROPRACTIC REFERRAL     predniSONE (DELTASONE) 10 MG tablet       Patient Instructions   Continue to ice your left knee   Massage in  Asper Cream     Go to Physical therapy for the knee pain      Take the prednisone to decrease the swelling              BMI:   Estimated body mass index is 30.51 kg/(m^2) as calculated from the following:    Height as of this encounter: 5' 5.5\" (1.664 m).    Weight as of this encounter: 186 lb 3.2 oz (84.5 kg).   Weight management plan: Discussed healthy diet and exercise guidelines and patient will follow up in 6 months in clinic to re-evaluate.      MEDICATIONS:        - Start taking Prednisone        - Continue other medications without change  See Patient Instructions    ORQUIDEA STOLL NP, APRN [facility]    "

## 2017-05-26 ENCOUNTER — TELEPHONE (OUTPATIENT)
Dept: FAMILY MEDICINE | Facility: CLINIC | Age: 62
End: 2017-05-26

## 2017-05-26 DIAGNOSIS — F33.1 MAJOR DEPRESSIVE DISORDER, RECURRENT EPISODE, MODERATE (H): ICD-10-CM

## 2017-05-26 RX ORDER — CITALOPRAM HYDROBROMIDE 40 MG/1
40 TABLET ORAL DAILY
Qty: 90 TABLET | Refills: 0 | Status: SHIPPED | OUTPATIENT
Start: 2017-05-26 | End: 2017-09-17

## 2017-05-26 NOTE — TELEPHONE ENCOUNTER
Citalopram  40mg     Last Written Prescription Date: 12/08/2016 #90 x 1  Last filled 04/26/2017  Last Office Visit with [facility] primary care provider:  04/25/2017 WANDY Castro        Last PHQ-9 score on record=   PHQ-9 SCORE 3/27/2017   Total Score -   Total Score MyChart -   Total Score 2

## 2017-05-26 NOTE — TELEPHONE ENCOUNTER
Received a rejection notice from [facility]    Re: buPROPion (WELLBUTRIN XL) 150 MG 24 hr tablet, Sig: Take 3 tablets (450 mg) by mouth every morning, Disp 270 x 1, Approved 03/31/2017 BENNIE NO    Notes on request: Please contact the insurance for a PA or send new Rx's for both the 150mg and 300mg tablets, thank you.    Let me know if we need to do the PA.    Al Purvis RT (r)  [facility]

## 2017-05-30 ENCOUNTER — TELEPHONE (OUTPATIENT)
Dept: FAMILY MEDICINE | Facility: CLINIC | Age: 62
End: 2017-05-30

## 2017-05-30 PROBLEM — F33.1 MAJOR DEPRESSIVE DISORDER, RECURRENT EPISODE, MODERATE (H): Status: ACTIVE | Noted: 2017-05-30

## 2017-05-30 RX ORDER — BUPROPION HYDROCHLORIDE 300 MG/1
300 TABLET ORAL EVERY MORNING
Qty: 90 TABLET | Refills: 1 | Status: SHIPPED | OUTPATIENT
Start: 2017-05-30 | End: 2017-06-26

## 2017-05-30 RX ORDER — BUPROPION HYDROCHLORIDE 150 MG/1
150 TABLET ORAL EVERY MORNING
Qty: 90 TABLET | Refills: 1 | Status: SHIPPED | OUTPATIENT
Start: 2017-05-30 | End: 2017-06-26

## 2017-05-30 NOTE — TELEPHONE ENCOUNTER
Pt called stating [facility] couldn't refill Buprion as insurance needs a quantity exception form submitted, nia has rejection notice from pharmacy and will start as soon as he can, will wait to hear from nia. Told pt to call back in a few days, she is asking for us to rush this if possible.     Tere Woodbeck  [facility] [place]

## 2017-05-30 NOTE — TELEPHONE ENCOUNTER
Follow up call to [facility], [phone], spoke with the pharmacist, they had just received the two prescriptions and no PA is needed.

## 2017-06-26 ENCOUNTER — TELEPHONE (OUTPATIENT)
Dept: FAMILY MEDICINE | Facility: CLINIC | Age: 62
End: 2017-06-26

## 2017-06-26 DIAGNOSIS — F33.1 MAJOR DEPRESSIVE DISORDER, RECURRENT EPISODE, MODERATE (H): ICD-10-CM

## 2017-06-26 RX ORDER — BUPROPION HYDROCHLORIDE 150 MG/1
450 TABLET ORAL EVERY MORNING
Qty: 90 TABLET | Refills: 0 | Status: SHIPPED | OUTPATIENT
Start: 2017-06-26 | End: 2017-07-23

## 2017-06-26 NOTE — TELEPHONE ENCOUNTER
Reason for Call:  Other call back    Detailed comments: She would like to go back on Bupropion 150 mg 3 tablets instead of the 150 mg tablet and 300 mg tablet.  Since starting the 300 mg tablets and the 150 mg tablets she is having a hard time getting out of bed.  She said that she will pay the 2 co-pays to go back on taking the 3 150 mg tablets per day.  Please advise.    Phone Number Patient can be reached at: Home number on file [phone] (home)    Best Time: any    Can we leave a detailed message on this number? YES    Call taken on 6/26/2017 at 11:22 AM by Nazia Simpson

## 2017-06-26 NOTE — TELEPHONE ENCOUNTER
I did change her prescription back to the  150 mg x 3 tablets. AND i did give her a referral to  Mental health to consult for medication to see if there is a better medication / combination of medication for her   Please mail her referral and also call her to let her know

## 2017-06-29 ENCOUNTER — TELEPHONE (OUTPATIENT)
Dept: FAMILY MEDICINE | Facility: CLINIC | Age: 62
End: 2017-06-29

## 2017-06-29 NOTE — TELEPHONE ENCOUNTER
Received response from [facility]    Response faxed to the Pharmacy        Al Purvis RT (r)  [facility]

## 2017-06-29 NOTE — TELEPHONE ENCOUNTER
Received PA request for Bupropion XL 150mg from [facility]  Pharmacy Rejection Note: 76 Plan Limitations Exceeded    Sig:Take 3 tablets (450 mg) by mouth every morning  Disp: 90 per 30  BENNIE: no    No previous PA on file for this med.    Dx: Major depressive disorder, recurrent episode, moderate (H) [F33.1]   Rationale: Tx of Major depressive disorder, recurrent episode, moderate (H) [F33.1]     Provided Ins: [employer]  Provided Ins ID: [id number]  Provided Ins Phone # [phone]    Unable to submit to [employer], called them [phone], indicated that she is a [employer] patient. Unable to submit to [employer]. Called [employer] [phone], initiated an over the phone request      Al REYES (r)  [facility]

## 2017-07-23 DIAGNOSIS — F33.1 MAJOR DEPRESSIVE DISORDER, RECURRENT EPISODE, MODERATE (H): ICD-10-CM

## 2017-07-24 NOTE — TELEPHONE ENCOUNTER
Bupropion ER 150mg       Last Written Prescription Date: 06/26/2017  #90 x 0  Last filled 06/24/2017  Last Office Visit with [facility], [facility] or [facility] prescribing provider:  04/25/2017 WANDY Castro        Last PHQ-9 score on record=   PHQ-9 SCORE 3/27/2017   Total Score -   Total Score MyChart -   Total Score 2       No results found for: AST  No results found for: ALT

## 2017-07-26 DIAGNOSIS — F33.1 MAJOR DEPRESSIVE DISORDER, RECURRENT EPISODE, MODERATE (H): ICD-10-CM

## 2017-07-26 RX ORDER — BUPROPION HYDROCHLORIDE 150 MG/1
TABLET ORAL
Qty: 90 TABLET | Refills: 0 | Status: SHIPPED | OUTPATIENT
Start: 2017-07-26 | End: 2018-03-22

## 2017-07-27 RX ORDER — BUPROPION HYDROCHLORIDE 150 MG/1
TABLET ORAL
Qty: 90 TABLET | Refills: 0 | Status: SHIPPED | OUTPATIENT
Start: 2017-07-27 | End: 2017-08-20

## 2017-07-27 NOTE — TELEPHONE ENCOUNTER
Wellbutrin XL 150mg       Last Written Prescription Date: 06/27/2017 #90 x 0  Last filled 06/24/2017  Last Office Visit with [facility], [facility] or [facility] prescribing provider:  04/25/2017 WANDY Castro    Approved 07/26/2017 on the 07/23/2017 encounter  TRANSMISSION FAILED        Last PHQ-9 score on record=   PHQ-9 SCORE 3/27/2017   Total Score -   Total Score MyChart -   Total Score 2       No results found for: AST  No results found for: ALT

## 2017-08-20 DIAGNOSIS — F33.1 MAJOR DEPRESSIVE DISORDER, RECURRENT EPISODE, MODERATE (H): ICD-10-CM

## 2017-08-21 RX ORDER — BUPROPION HYDROCHLORIDE 150 MG/1
TABLET ORAL
Qty: 270 TABLET | Refills: 1 | Status: SHIPPED | OUTPATIENT
Start: 2017-08-21 | End: 2018-02-13

## 2017-08-21 NOTE — TELEPHONE ENCOUNTER
Bupropion XL 150mg       Last Written Prescription Date: 07/27/2017 #90 x 0  Last filled 07/27/2017  Last Office Visit with [facility], [facility] or [facility] prescribing provider:  04/25/2017 WANDY Castro        Last PHQ-9 score on record=   PHQ-9 SCORE 7/29/2017   Total Score -   Total Score MyChart 4 (Minimal depression)   Total Score 4       No results found for: AST  No results found for: ALT

## 2017-09-17 DIAGNOSIS — F33.1 MAJOR DEPRESSIVE DISORDER, RECURRENT EPISODE, MODERATE (H): ICD-10-CM

## 2017-09-18 NOTE — TELEPHONE ENCOUNTER
Citalopram 40mg     Last Written Prescription Date: 05/26/2017 #90 x 0  Last filled 08/20/2017   Last Office Visit with [facility] primary care provider:  04/25/2017  WANDY Castro        Last PHQ-9 score on record=   PHQ-9 SCORE 7/29/2017   Total Score -   Total Score MyChart 4 (Minimal depression)   Total Score 4

## 2017-09-19 RX ORDER — CITALOPRAM HYDROBROMIDE 40 MG/1
TABLET ORAL
Qty: 90 TABLET | Refills: 1 | Status: SHIPPED | OUTPATIENT
Start: 2017-09-19 | End: 2018-03-09

## 2017-09-19 NOTE — TELEPHONE ENCOUNTER
Prescription approved per [facility] [place].  Ignacia Carbtree RN

## 2017-09-20 ENCOUNTER — TELEPHONE (OUTPATIENT)
Dept: FAMILY MEDICINE | Facility: CLINIC | Age: 62
End: 2017-09-20

## 2017-09-20 NOTE — TELEPHONE ENCOUNTER
Pt is calling and states that her insurance wont allow her to get 90 pills at a time, she has to get it for Qty of 30 pills and she is asking if we can give her 6 refills so she is able to just call her pharmacy and get it and not have to call us.     Yessica Lin, Station

## 2017-09-20 NOTE — TELEPHONE ENCOUNTER
Called [facility] they are aware and have informed pt she has 6 months of meds but can only fill 30 days at a time due to insurance   Pt informed   MEDARDO Shukla  Clinic  RN/Nic Calvin

## 2017-12-06 ENCOUNTER — TELEPHONE (OUTPATIENT)
Dept: FAMILY MEDICINE | Facility: CLINIC | Age: 62
End: 2017-12-06

## 2017-12-06 NOTE — TELEPHONE ENCOUNTER
The Patient declined Preventive Health Screens for: MAMMOGRAM  Please review chart and follow-up with patient if needed.    Thank you

## 2017-12-26 ENCOUNTER — MYC MEDICAL ADVICE (OUTPATIENT)
Dept: FAMILY MEDICINE | Facility: CLINIC | Age: 62
End: 2017-12-26

## 2018-01-04 NOTE — TELEPHONE ENCOUNTER
Called patient informed her she still has a refill will call if she needs anything else.    Nic Dye [facility]

## 2018-02-13 DIAGNOSIS — F33.1 MAJOR DEPRESSIVE DISORDER, RECURRENT EPISODE, MODERATE (H): ICD-10-CM

## 2018-02-14 NOTE — TELEPHONE ENCOUNTER
"Requested Prescriptions   Pending Prescriptions Disp Refills     buPROPion (WELLBUTRIN XL) 150 MG 24 hr tablet [Pharmacy Med Name: BuPROPion HCl ER (XL) Oral Tablet Extended Release 24 Hour 150 MG] 90 tablet 0    Last Written Prescription Date:  8/21/17  Last Fill Quantity: 270,  # refills: 1   Last office visit: 4/25/2017 with prescribing provider:  4/25/17   Future Office Visit:     Sig: TAKE THREE TABLETS BY MOUTH IN THE MORNING    SSRIs Protocol Failed    2/13/2018  7:26 PM       Failed - PHQ-9 score less than 5 in past 6 months    The PHQ-9 criteria is meant to fail. It requires a PHQ-9 score review         Failed - Recent (6 mo) or future visit with authorizing provider's specialty    Patient had office visit in the last 6 months or has a visit in the next 30 days with authorizing provider.  See \"Patient Info\" tab in inbasket, or \"Choose Columns\" in Meds & Orders section of the refill encounter.           Passed - Medication is Bupropion    If the medication is Bupropion (Wellbutrin), and the patient is taking for smoking cessation; OK to refill.         Passed - Patient is age 18 or older       Passed - No active pregnancy on record       Passed - No positive pregnancy test in last 12 months          "

## 2018-02-15 RX ORDER — BUPROPION HYDROCHLORIDE 150 MG/1
TABLET ORAL
Qty: 90 TABLET | Refills: 0 | Status: SHIPPED | OUTPATIENT
Start: 2018-02-15 | End: 2018-03-09

## 2018-02-15 NOTE — TELEPHONE ENCOUNTER
Medication is being filled for 1 time refill only due to:   Over due for office visit and/or labs   MEDARDO Jose  RN/Nic Calvin

## 2018-03-09 DIAGNOSIS — F33.1 MAJOR DEPRESSIVE DISORDER, RECURRENT EPISODE, MODERATE (H): ICD-10-CM

## 2018-03-09 NOTE — TELEPHONE ENCOUNTER
"Requested Prescriptions   Pending Prescriptions Disp Refills     citalopram (CELEXA) 40 MG tablet [Pharmacy Med Name: Citalopram Hydrobromide Oral Tablet 40 MG] 30 tablet 0    Last Written Prescription Date:  09/19/2017 #90 x 1  Last filled 02/14/2018  Last office visit: 4/25/2017 WANDY Castro   Future Office Visit: None    Sig: TAKE 1 TABLET BY MOUTH EVERYDAY    SSRIs Protocol Failed    3/9/2018  4:34 PM  PHQ-9 SCORE 10/9/2016 3/27/2017 7/29/2017   Total Score - - -   Total Score MyChart 4 (Minimal depression) - 4 (Minimal depression)   Total Score - 2 4       ANUSHA-7 SCORE 1/26/2015   Total Score 3              Failed - PHQ-9 score less than 5 in past 6 months    The PHQ-9 criteria is meant to fail. It requires a PHQ-9 score review         Failed - Recent (6 mo) or future (30 days) visit within the authorizing provider's specialty    Patient had office visit in the last 6 months or has a visit in the next 30 days with authorizing provider or within the authorizing provider's specialty.  See \"Patient Info\" tab in inbasket, or \"Choose Columns\" in Meds & Orders section of the refill encounter.           Passed - Medication is Bupropion    If the medication is Bupropion (Wellbutrin), and the patient is taking for smoking cessation; OK to refill.         Passed - Patient is age 18 or older       Passed - No active pregnancy on record       Passed - No positive pregnancy test in last 12 months        buPROPion (WELLBUTRIN XL) 150 MG 24 hr tablet [Pharmacy Med Name: BuPROPion HCl ER (XL) Oral Tablet Extended Release 24 Hour 150 MG] 90 tablet 0    Last Written Prescription Date:  02/15/2018 #90 x 0  Last filled 02/15/2018  Last office visit: 4/25/2017 WANDY Castro   Future Office Visit:  None   Sig: TAKE THREE TABLETS BY MOUTH IN THE MORNING    SSRIs Protocol Failed    3/9/2018  4:34 PM  PHQ-9 SCORE 10/9/2016 3/27/2017 7/29/2017   Total Score - - -   Total Score MyChart 4 (Minimal depression) - 4 (Minimal depression)   Total " "Score - 2 4       ANUSHA-7 SCORE 1/26/2015   Total Score 3              Failed - PHQ-9 score less than 5 in past 6 months    The PHQ-9 criteria is meant to fail. It requires a PHQ-9 score review         Failed - Recent (6 mo) or future (30 days) visit within the authorizing provider's specialty    Patient had office visit in the last 6 months or has a visit in the next 30 days with authorizing provider or within the authorizing provider's specialty.  See \"Patient Info\" tab in inbasket, or \"Choose Columns\" in Meds & Orders section of the refill encounter.           Passed - Medication is Bupropion    If the medication is Bupropion (Wellbutrin), and the patient is taking for smoking cessation; OK to refill.         Passed - Patient is age 18 or older       Passed - No active pregnancy on record       Passed - No positive pregnancy test in last 12 months          "

## 2018-03-12 RX ORDER — CITALOPRAM HYDROBROMIDE 40 MG/1
TABLET ORAL
Qty: 30 TABLET | Refills: 0 | Status: SHIPPED | OUTPATIENT
Start: 2018-03-12 | End: 2018-03-22

## 2018-03-12 RX ORDER — BUPROPION HYDROCHLORIDE 150 MG/1
TABLET ORAL
Qty: 90 TABLET | Refills: 0 | Status: SHIPPED | OUTPATIENT
Start: 2018-03-12 | End: 2018-03-22

## 2018-03-12 NOTE — TELEPHONE ENCOUNTER
Medication is being filled for 1 time refill only due to: mychart note sent need appt   Over due for office visit and/or labs   MEDARDO Jose  RN/Nic Calvin

## 2018-03-22 ENCOUNTER — OFFICE VISIT (OUTPATIENT)
Dept: FAMILY MEDICINE | Facility: CLINIC | Age: 63
End: 2018-03-22
Payer: COMMERCIAL

## 2018-03-22 VITALS
HEIGHT: 66 IN | SYSTOLIC BLOOD PRESSURE: 126 MMHG | WEIGHT: 187.8 LBS | BODY MASS INDEX: 30.18 KG/M2 | HEART RATE: 72 BPM | TEMPERATURE: 98.2 F | DIASTOLIC BLOOD PRESSURE: 78 MMHG

## 2018-03-22 DIAGNOSIS — F33.1 MAJOR DEPRESSIVE DISORDER, RECURRENT EPISODE, MODERATE (H): Primary | ICD-10-CM

## 2018-03-22 DIAGNOSIS — Z13.6 CARDIOVASCULAR SCREENING; LDL GOAL LESS THAN 160: ICD-10-CM

## 2018-03-22 DIAGNOSIS — R19.5 POSITIVE FIT (FECAL IMMUNOCHEMICAL TEST): ICD-10-CM

## 2018-03-22 DIAGNOSIS — E03.4 HYPOTHYROIDISM DUE TO ACQUIRED ATROPHY OF THYROID: ICD-10-CM

## 2018-03-22 DIAGNOSIS — Z12.11 SPECIAL SCREENING FOR MALIGNANT NEOPLASMS, COLON: ICD-10-CM

## 2018-03-22 DIAGNOSIS — E55.9 VITAMIN D DEFICIENCY: ICD-10-CM

## 2018-03-22 LAB
ANION GAP SERPL CALCULATED.3IONS-SCNC: 7 MMOL/L (ref 3–14)
BUN SERPL-MCNC: 21 MG/DL (ref 7–30)
CALCIUM SERPL-MCNC: 9.2 MG/DL (ref 8.5–10.1)
CHLORIDE SERPL-SCNC: 106 MMOL/L (ref 94–109)
CHOLEST SERPL-MCNC: 269 MG/DL
CO2 SERPL-SCNC: 26 MMOL/L (ref 20–32)
CREAT SERPL-MCNC: 0.86 MG/DL (ref 0.52–1.04)
GFR SERPL CREATININE-BSD FRML MDRD: 67 ML/MIN/1.7M2
GLUCOSE SERPL-MCNC: 87 MG/DL (ref 70–99)
HDLC SERPL-MCNC: 81 MG/DL
LDLC SERPL CALC-MCNC: 162 MG/DL
NONHDLC SERPL-MCNC: 188 MG/DL
POTASSIUM SERPL-SCNC: 4.7 MMOL/L (ref 3.4–5.3)
SODIUM SERPL-SCNC: 139 MMOL/L (ref 133–144)
TRIGL SERPL-MCNC: 131 MG/DL
TSH SERPL DL<=0.005 MIU/L-ACNC: 1.32 MU/L (ref 0.4–4)

## 2018-03-22 PROCEDURE — 82274 ASSAY TEST FOR BLOOD FECAL: CPT | Performed by: NURSE PRACTITIONER

## 2018-03-22 PROCEDURE — 36415 COLL VENOUS BLD VENIPUNCTURE: CPT | Performed by: NURSE PRACTITIONER

## 2018-03-22 PROCEDURE — 99213 OFFICE O/P EST LOW 20 MIN: CPT | Performed by: NURSE PRACTITIONER

## 2018-03-22 PROCEDURE — 84443 ASSAY THYROID STIM HORMONE: CPT | Performed by: NURSE PRACTITIONER

## 2018-03-22 PROCEDURE — 82306 VITAMIN D 25 HYDROXY: CPT | Performed by: NURSE PRACTITIONER

## 2018-03-22 PROCEDURE — 80048 BASIC METABOLIC PNL TOTAL CA: CPT | Performed by: NURSE PRACTITIONER

## 2018-03-22 PROCEDURE — 80061 LIPID PANEL: CPT | Performed by: NURSE PRACTITIONER

## 2018-03-22 RX ORDER — CITALOPRAM HYDROBROMIDE 40 MG/1
TABLET ORAL
Qty: 30 TABLET | Refills: 5 | Status: SHIPPED | OUTPATIENT
Start: 2018-03-22 | End: 2018-09-28

## 2018-03-22 RX ORDER — LEVOTHYROXINE SODIUM 88 UG/1
88 TABLET ORAL DAILY
Qty: 90 TABLET | Refills: 3 | Status: SHIPPED | OUTPATIENT
Start: 2018-03-22 | End: 2019-02-19

## 2018-03-22 RX ORDER — BUPROPION HYDROCHLORIDE 150 MG/1
TABLET ORAL
Qty: 90 TABLET | Refills: 5 | Status: SHIPPED | OUTPATIENT
Start: 2018-03-22 | End: 2018-07-02

## 2018-03-22 ASSESSMENT — ANXIETY QUESTIONNAIRES
6. BECOMING EASILY ANNOYED OR IRRITABLE: SEVERAL DAYS
5. BEING SO RESTLESS THAT IT IS HARD TO SIT STILL: NOT AT ALL
3. WORRYING TOO MUCH ABOUT DIFFERENT THINGS: NOT AT ALL
GAD7 TOTAL SCORE: 1
2. NOT BEING ABLE TO STOP OR CONTROL WORRYING: NOT AT ALL
7. FEELING AFRAID AS IF SOMETHING AWFUL MIGHT HAPPEN: NOT AT ALL
1. FEELING NERVOUS, ANXIOUS, OR ON EDGE: NOT AT ALL

## 2018-03-22 ASSESSMENT — PATIENT HEALTH QUESTIONNAIRE - PHQ9: 5. POOR APPETITE OR OVEREATING: NOT AT ALL

## 2018-03-22 NOTE — PROGRESS NOTES
SUBJECTIVE:   Enid Sexton is a 62 year old female who presents to clinic today for the following health issues:    Hypothyroidism Follow-up      Since last visit, patient describes the following symptoms: Weight stable, no hair loss, no skin changes, no constipation, no loose stools    Depression Followup    Status since last visit: Stable , but is feeling more tired.    See PHQ-9 for current symptoms.  Other associated symptoms: None    Complicating factors:   Significant life event:  Yes-  Joined the [employer] at the beginning of March, is looking for activites to get her out of the house.   Current substance abuse:  None  Anxiety or Panic symptoms:  No    PHQ-9 6/23/2016 3/27/2017 7/29/2017   Total Score 6 2 4   Q9: Suicide Ideation Several days Not at all Not at all     In the past two weeks have you had thoughts of suicide or self-harm?  No.    Do you have concerns about your personal safety or the safety of others?   No  PHQ-9  English  PHQ-9   Any Language  Suicide Assessment Five-step Evaluation and Treatment (SAFE-T)    Amount of exercise or physical activity: 2-3 days/week for an average of 30-45 minutes    Problems taking medications regularly: No    Medication side effects: none    Diet: regular (no restrictions)        Sleep issues     Problem list and histories reviewed & adjusted, as indicated.  Additional history: as documented    Patient Active Problem List   Diagnosis     CARDIOVASCULAR SCREENING; LDL GOAL LESS THAN 160     Hypothyroidism     Vitamin D deficiency     Wrist pain     Advanced directives, counseling/discussion     Iron deficiency anemia     Ganglion cyst     Positive FIT (fecal immunochemical test)     Major depressive disorder, recurrent episode, moderate (H)     Past Surgical History:   Procedure Laterality Date     ABDOMEN SURGERY  laperoscopy    exploratory     BIOPSY  10 years?    after pap smear - was nothing there     EYE SURGERY  lasik surgery     lapraoscopy         Social History    Substance Use Topics     Smoking status: Former Smoker     Smokeless tobacco: Never Used     Alcohol use No      Comment: rare     Family History   Problem Relation Age of Onset     Hypertension Mother      Other Cancer Mother      CANCER Father      bladder cancer/smoker     Other Cancer Father      Depression Niece          Current Outpatient Prescriptions   Medication Sig Dispense Refill     citalopram (CELEXA) 40 MG tablet TAKE 1 TABLET BY MOUTH EVERYDAY 30 tablet 0     buPROPion (WELLBUTRIN XL) 150 MG 24 hr tablet TAKE THREE TABLETS BY MOUTH IN THE MORNING 90 tablet 0     vitamin B complex with vitamin C (VITAMIN  B COMPLEX) TABS tablet Take 1 tablet by mouth daily       levothyroxine (SYNTHROID/LEVOTHROID) 88 MCG tablet Take 1 tablet (88 mcg) by mouth daily 90 tablet 3     aspirin 325 MG tablet Take by mouth daily       Multiple Vitamin (DAILY MULTIVITAMIN PO) Take by mouth daily Reported on 3/27/2017       IRON PO Take by mouth daily       Calcium Citrate-Vitamin D (CALCIUM CITRATE + D PO) Take by mouth as needed 2 times per week       [DISCONTINUED] buPROPion (WELLBUTRIN XL) 150 MG 24 hr tablet TAKE 3 TABLETS BY MOUTH EVERY MORNING  90 tablet 0     No Known Allergies  BP Readings from Last 3 Encounters:   03/22/18 126/78   04/25/17 118/80   03/27/17 128/80    Wt Readings from Last 3 Encounters:   03/22/18 187 lb 12.8 oz (85.2 kg)   04/25/17 186 lb 3.2 oz (84.5 kg)   03/27/17 184 lb 12.8 oz (83.8 kg)                    Reviewed and updated as needed this visit by clinical staff       Reviewed and updated as needed this visit by Provider         ROS:  CONSTITUTIONAL: NEGATIVE for fever, chills, change in weight  ENT/MOUTH: NEGATIVE for ear, mouth and throat problems  RESP: NEGATIVE for significant cough or SOB  CV: NEGATIVE for chest pain, palpitations or peripheral edema  GI: NEGATIVE for nausea, abdominal pain, heartburn, or change in bowel habits  MUSCULOSKELETAL: NEGATIVE for significant arthralgias or  "myalgia. Dose have a bump on the left knee .   PSYCHIATRIC: POSITIVE for depression. Isn't always as interested in things.  She feels that she is just  Too serious   Her Dad was mean and she didn't  have a fun childhood.    She is going to the  to work out.   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   Is bored at times.     Does have some  Day trips with her Mom .  Doesn't like taking 2 antidepressants.        OBJECTIVE:     /78  Pulse 72  Temp 98.2  F (36.8  C) (Tympanic)  Ht 5' 5.5\" (1.664 m)  Wt 187 lb 12.8 oz (85.2 kg)  BMI 30.78 kg/m2  Body mass index is 30.78 kg/(m^2).   GENERAL: healthy, alert and no distress  HENT: ear canals and TM's normal, nose and mouth without ulcers or lesions  NECK: no adenopathy, no asymmetry, masses, or scars and thyroid normal to palpation  RESP: lungs clear to auscultation - no rales, rhonchi or wheezes  CV: regular rate and rhythm, normal S1 S2, no S3 or S4, no murmur, click or rub, no peripheral edema and peripheral pulses strong  MS: left knee does have  Full ROM with the left knee     PSYCH: affect normal/bright, judgement and insight intact, appearance well groomed and is disappointment with not working      Diagnostic Test Results:  Pending     ASSESSMENT/PLAN:     ASSESSMENT/PLAN:      ICD-10-CM    1. Major depressive disorder, recurrent episode, moderate (H) F33.1 citalopram (CELEXA) 40 MG tablet     buPROPion (WELLBUTRIN XL) 150 MG 24 hr tablet     DEPRESSION ACTION PLAN (DAP)   2. Hypothyroidism due to acquired atrophy of thyroid E03.4 levothyroxine (SYNTHROID/LEVOTHROID) 88 MCG tablet     TSH   3. Special screening for malignant neoplasms, colon Z12.11 GASTROENTEROLOGY ADULT REF PROCEDURE ONLY [facility] [phone]   4. CARDIOVASCULAR SCREENING; LDL GOAL LESS THAN 160 Z13.6 Lipid panel reflex to direct LDL Fasting     Basic metabolic panel  (Ca, Cl, CO2, " Creat, Gluc, K, Na, BUN)   5. Positive FIT (fecal immunochemical test) R19.5 GASTROENTEROLOGY ADULT REF PROCEDURE ONLY [facility] [phone]     Fecal colorectal cancer screen (FIT)   6. Vitamin D deficiency E55.9 Vitamin D Deficiency       Patient Instructions   For your depression  Continue with the medication as it is .   During the summer months you can try taking 2 tablets of the Wellbutrin and see how that goes     Try go to the [facility]  As often as possible.     Try volunteering ,  Check out the different churches                      MEDICATIONS:  Continue current medications without change  Work on weight loss  Regular exercise  See Patient Instructions    ORQUIDEA STOLL NP, APRN [facility]

## 2018-03-22 NOTE — PATIENT INSTRUCTIONS
For your depression  Continue with the medication as it is .   During the summer months you can try taking 2 tablets of the Wellbutrin and see how that goes     Try go to the [facility]  As often as possible.     Try volunteering ,  Check out the different churches

## 2018-03-22 NOTE — NURSING NOTE
"Chief Complaint   Patient presents with     Thyroid Problem     Depression       Initial /78  Pulse 72  Temp 98.2  F (36.8  C) (Tympanic)  Ht 5' 5.5\" (1.664 m)  Wt 187 lb 12.8 oz (85.2 kg)  BMI 30.78 kg/m2 Estimated body mass index is 30.78 kg/(m^2) as calculated from the following:    Height as of this encounter: 5' 5.5\" (1.664 m).    Weight as of this encounter: 187 lb 12.8 oz (85.2 kg).  Medication Reconciliation: complete     Tere Davis CMA([facility])      "

## 2018-03-22 NOTE — MR AVS SNAPSHOT
After Visit Summary   3/22/2018    Enid Sexton    MRN: [id number]           Patient Information     Date Of Birth          1955        Visit Information        Provider Department      3/22/2018 11:20 AM Yuli Castro APRN [facility]        Today's Diagnoses     Major depressive disorder, recurrent episode, moderate (H)    -  1    Hypothyroidism due to acquired atrophy of thyroid        Special screening for malignant neoplasms, colon        CARDIOVASCULAR SCREENING; LDL GOAL LESS THAN 160        Positive FIT (fecal immunochemical test)        Vitamin D deficiency          Care Instructions    For your depression  Continue with the medication as it is .   During the summer months you can try taking 2 tablets of the Wellbutrin and see how that goes     Try go to the [facility]  As often as possible.     Try volunteering ,  Check out the different churches               Follow-ups after your visit        Additional Services     GASTROENTEROLOGY ADULT REF PROCEDURE ONLY [facility] [phone]       Last Lab Result: Creatinine (mg/dL)       Date                     Value                 03/27/2017               0.93             ----------  There is no height or weight on file to calculate BMI.     Needed:  No  Language:  English    Patient will be contacted to schedule procedure.     Please be aware that coverage of these services is subject to the terms and limitations of your health insurance plan.  Call member services at your health plan with any benefit or coverage questions.  Any procedures must be performed at a [place] facility OR coordinated by your clinic's referral office.    Please bring the following with you to your appointment:    (1) Any X-Rays, CTs or MRIs which have been performed.  Contact the facility where they were done to arrange for  prior to your scheduled appointment.    (2) List of current medications   (3) This referral  "request   (4) Any documents/labs given to you for this referral                  Future tests that were ordered for you today     Open Future Orders        Priority Expected Expires Ordered    Fecal colorectal cancer screen (FIT) Routine 4/12/2018 6/14/2018 3/22/2018            Who to contact     Normal or non-critical lab and imaging results will be communicated to you by Slantrangehart, letter or phone within 4 business days after the clinic has received the results. If you do not hear from us within 7 days, please contact the clinic through Slantrangehart or phone. If you have a critical or abnormal lab result, we will notify you by phone as soon as possible.  Submit refill requests through V[employer]el or call your pharmacy and they will forward the refill request to us. Please allow 3 business days for your refill to be completed.          If you need to speak with a  for additional information , please call: [phone]           Additional Information About Your Visit        VGTel Information     VGTel gives you secure access to your electronic health record. If you see a primary care provider, you can also send messages to your care team and make appointments. If you have questions, please call your primary care clinic.  If you do not have a primary care provider, please call [phone] and they will assist you.        Care EveryWhere ID     This is your Care EveryWhere ID. This could be used by other organizations to access your [facility] medical records  [id number]        Your Vitals Were     Pulse Temperature Height BMI (Body Mass Index)          72 98.2  F (36.8  C) (Tympanic) 5' 5.5\" (1.664 m) 30.78 kg/m2         Blood Pressure from Last 3 Encounters:   03/22/18 126/78   04/25/17 118/80   03/27/17 128/80    Weight from Last 3 Encounters:   03/22/18 187 lb 12.8 oz (85.2 kg)   04/25/17 186 lb 3.2 oz (84.5 kg)   03/27/17 184 lb 12.8 oz (83.8 kg)              We Performed the Following     Basic " metabolic panel  (Ca, Cl, CO2, Creat, Gluc, K, Na, BUN)     DEPRESSION ACTION PLAN (DAP)     GASTROENTEROLOGY ADULT REF PROCEDURE ONLY [facility] [phone]     Lipid panel reflex to direct LDL Fasting     TSH     Vitamin D Deficiency          Today's Medication Changes          These changes are accurate as of 3/22/18 12:08 PM.  If you have any questions, ask your nurse or doctor.               These medicines have changed or have updated prescriptions.        Dose/Directions    buPROPion 150 MG 24 hr tablet   Commonly known as:  WELLBUTRIN XL   This may have changed:  See the new instructions.   Used for:  Major depressive disorder, recurrent episode, moderate (H)   Changed by:  Yuli Castro APRN CNP        TAKE THREE TABLETS BY MOUTH IN THE MORNING   Quantity:  90 tablet   Refills:  5       citalopram 40 MG tablet   Commonly known as:  celeXA   This may have changed:  See the new instructions.   Used for:  Major depressive disorder, recurrent episode, moderate (H)   Changed by:  Yuli Castro APRN CNP        TAKE 1 TABLET BY MOUTH EVERYDAY   Quantity:  30 tablet   Refills:  5         Stop taking these medicines if you haven't already. Please contact your care team if you have questions.     predniSONE 10 MG tablet   Commonly known as:  DELTASONE   Stopped by:  Yuli Castro APRN CNP                Where to get your medicines      These medications were sent to [id number] - [place] [facility] [place] - [address] [address] [facility] [address]     Phone:  [phone]     buPROPion 150 MG 24 hr tablet    citalopram 40 MG tablet    levothyroxine 88 MCG tablet                Primary Care Provider Office Phone # Fax #    GUSTAVO Santiago [phone] [address] [place] [address]        Equal Access to Services     VIPIN LOZA AH: Bal Maxwell, kylie trimble, uriah wiley,  umberto carrerojeanne vigil'aan ah. So [facility] [phone].    ATENCIÓN: Si habla santos, tiene a chiu disposición servicios gratuitos de asistencia lingüística. Cruz escoto [phone].    We comply with applicable federal civil rights laws and [place] laws. We do not discriminate on the basis of race, color, national origin, age, disability, sex, sexual orientation, or gender identity.            Thank you!     Thank you for choosing [facility]  for your care. Our goal is always to provide you with excellent care. Hearing back from our patients is one way we can continue to improve our services. Please take a few minutes to complete the written survey that you may receive in the mail after your visit with us. Thank you!             Your Updated Medication List - Protect others around you: Learn how to safely use, store and throw away your medicines at www.disposemymeds.org.          This list is accurate as of 3/22/18 12:08 PM.  Always use your most recent med list.                   Brand Name Dispense Instructions for use Diagnosis    aspirin 325 MG tablet      Take by mouth daily        buPROPion 150 MG 24 hr tablet    WELLBUTRIN XL    90 tablet    TAKE THREE TABLETS BY MOUTH IN THE MORNING    Major depressive disorder, recurrent episode, moderate (H)       CALCIUM CITRATE + D PO      Take by mouth as needed 2 times per week        citalopram 40 MG tablet    celeXA    30 tablet    TAKE 1 TABLET BY MOUTH EVERYDAY    Major depressive disorder, recurrent episode, moderate (H)       DAILY MULTIVITAMIN PO      Take by mouth daily Reported on 3/27/2017        IRON PO      Take by mouth daily        levothyroxine 88 MCG tablet    SYNTHROID/LEVOTHROID    90 tablet    Take 1 tablet (88 mcg) by mouth daily    Hypothyroidism due to acquired atrophy of thyroid       vitamin B complex with vitamin C Tabs tablet      Take 1 tablet by mouth daily

## 2018-03-22 NOTE — LETTER
My Depression Action Plan  Name: Enid Sexton   Date of Birth 1955  Date: 3/22/2018    My doctor: Yuli Castro   My clinic: [address] [id number] [phone]          GREEN    ZONE   Good Control    What it looks like:     Things are going generally well. You have normal up s and down s. You may even feel depressed from time to time, but bad moods usually last less than a day.   What you need to do:  1. Continue to care for yourself (see self care plan)  2. Check your depression survival kit and update it as needed  3. Follow your physician s recommendations including any medication.  4. Do not stop taking medication unless you consult with your physician first.           YELLOW         ZONE Getting Worse    What it looks like:     Depression is starting to interfere with your life.     It may be hard to get out of bed; you may be starting to isolate yourself from others.    Symptoms of depression are starting to last most all day and this has happened for several days.     You may have suicidal thoughts but they are not constant.   What you need to do:     1. Call your care team, your response to treatment will improve if you keep your care team informed of your progress. Yellow periods are signs an adjustment may need to be made.     2. Continue your self-care, even if you have to fake it!    3. Talk to someone in your support network    4. Open up your depression survival kit           RED    ZONE Medical Alert - Get Help    What it looks like:     Depression is seriously interfering with your life.     You may experience these or other symptoms: You can t get out of bed most days, can t work or engage in other necessary activities, you have trouble taking care of basic hygiene, or basic responsibilities, thoughts of suicide or death that will not go away, self-injurious behavior.     What you need to do:  1. Call your care team and  request a same-day appointment. If they are not available (weekends or after hours) call your local crisis line, emergency room or 911.            Depression Self Care Plan / Survival Kit    Self-Care for Depression  Here s the deal. Your body and mind are really not as separate as most people think.  What you do and think affects how you feel and how you feel influences what you do and think. This means if you do things that people who feel good do, it will help you feel better.  Sometimes this is all it takes.  There is also a place for medication and therapy depending on how severe your depression is, so be sure to consult with your medical provider and/ or Behavioral Health Consultant if your symptoms are worsening or not improving.     In order to better manage my stress, I will:    Exercise  Get some form of exercise, every day. This will help reduce pain and release endorphins, the  feel good  chemicals in your brain. This is almost as good as taking antidepressants!  This is not the same as joining a gym and then never going! (they count on that by the way ) It can be as simple as just going for a walk or doing some gardening, anything that will get you moving.      Hygiene   Maintain good hygiene (Get out of bed in the morning, Make your bed, Brush your teeth, Take a shower, and Get dressed like you were going to work, even if you are unemployed).  If your clothes don't fit try to get ones that do.    Diet  I will strive to eat foods that are good for me, drink plenty of water, and avoid excessive sugar, caffeine, alcohol, and other mood-altering substances.  Some foods that are helpful in depression are: complex carbohydrates, B vitamins, flaxseed, fish or fish oil, fresh fruits and vegetables.    Psychotherapy  I agree to participate in Individual Therapy (if recommended).    Medication  If prescribed medications, I agree to take them.  Missing doses can result in serious side effects.  I understand that  drinking alcohol, or other illicit drug use, may cause potential side effects.  I will not stop my medication abruptly without first discussing it with my provider.    Staying Connected With Others  I will stay in touch with my friends, family members, and my primary care provider/team.    Use your imagination  Be creative.  We all have a creative side; it doesn t matter if it s oil painting, sand castles, or mud pies! This will also kick up the endorphins.    Witness Beauty  (AKA stop and smell the roses) Take a look outside, even in mid-winter. Notice colors, textures. Watch the squirrels and birds.     Service to others  Be of service to others.  There is always someone else in need.  By helping others we can  get out of ourselves  and remember the really important things.  This also provides opportunities for practicing all the other parts of the program.    Humor  Laugh and be silly!  Adjust your TV habits for less news and crime-drama and more comedy.    Control your stress  Try breathing deep, massage therapy, biofeedback, and meditation. Find time to relax each day.     My support system    Clinic Contact:  Phone number:    Contact 1:  Phone number:    Contact 2:  Phone number:    Yazidi/:  Phone number:    Therapist:  Phone number:    Local crisis center:    Phone number:    Other community support:  Phone number:

## 2018-03-23 ENCOUNTER — TELEPHONE (OUTPATIENT)
Dept: FAMILY MEDICINE | Facility: CLINIC | Age: 63
End: 2018-03-23

## 2018-03-23 LAB — DEPRECATED CALCIDIOL+CALCIFEROL SERPL-MC: 39 UG/L (ref 20–75)

## 2018-03-23 ASSESSMENT — PATIENT HEALTH QUESTIONNAIRE - PHQ9: SUM OF ALL RESPONSES TO PHQ QUESTIONS 1-9: 4

## 2018-03-23 ASSESSMENT — ANXIETY QUESTIONNAIRES: GAD7 TOTAL SCORE: 1

## 2018-03-23 NOTE — TELEPHONE ENCOUNTER
We had talked about both  Tests and I thought she was reluctantly going to do the colonoscopy because of the positive FIT test.      It this FIT test is positive she will have to do the colonoscopy  !!!

## 2018-03-23 NOTE — TELEPHONE ENCOUNTER
She had a positive FIT test  3/27/17   Exam Information   Exam Date Exam Time Accession # Results    3/27/17 10:00 AM [id number]    Component Results   Component Value Flag Ref Range Units Status Collected Lab   Occult Blood Scn FIT Positive (A) NEG  Final 03/27/2017 10:00 AM 51   Lab and Collection   Fecal colorectal cancer screen FIT (Order #[id number]) on 3/30/2017 -      And I do not see a negative follow up FIT test so I am not sure what they are talking about   Yuli Castro APRN-CNP

## 2018-03-23 NOTE — TELEPHONE ENCOUNTER
[place] called back and apologized that she was looking at 2016, but when she called the patient the patient said you gave her another fit test.  Please advise if you want her to do the fit test or colonoscopy.  Patient says you know she don't want to do the colonoscopy.  [place] is not calling patient again.    Brigid Jimenez [facility]

## 2018-03-23 NOTE — TELEPHONE ENCOUNTER
[place] called and said that you have an order for an colonoscopy and the patients fit test was negative not positive.  They are not sure why your ordering this.    Brigid Jimenez [facility]

## 2018-03-25 LAB — HEMOCCULT STL QL IA: NEGATIVE

## 2018-03-26 DIAGNOSIS — R19.5 POSITIVE FIT (FECAL IMMUNOCHEMICAL TEST): ICD-10-CM

## 2018-06-29 ENCOUNTER — TELEPHONE (OUTPATIENT)
Dept: FAMILY MEDICINE | Facility: CLINIC | Age: 63
End: 2018-06-29

## 2018-06-29 DIAGNOSIS — F33.1 MAJOR DEPRESSIVE DISORDER, RECURRENT EPISODE, MODERATE (H): ICD-10-CM

## 2018-07-02 ENCOUNTER — TELEPHONE (OUTPATIENT)
Dept: FAMILY MEDICINE | Facility: CLINIC | Age: 63
End: 2018-07-02

## 2018-07-02 ENCOUNTER — MYC MEDICAL ADVICE (OUTPATIENT)
Dept: NURSING | Facility: CLINIC | Age: 63
End: 2018-07-02

## 2018-07-02 RX ORDER — BUPROPION HYDROCHLORIDE 150 MG/1
TABLET ORAL
Qty: 90 TABLET | Refills: 5 | Status: SHIPPED | OUTPATIENT
Start: 2018-07-02 | End: 2019-02-18

## 2018-07-02 RX ORDER — BUPROPION HYDROCHLORIDE 300 MG/1
TABLET ORAL
Qty: 30 TABLET | Refills: 0 | OUTPATIENT
Start: 2018-07-02

## 2018-07-02 NOTE — TELEPHONE ENCOUNTER
PHQ-9 SCORE 3/27/2017 7/29/2017 3/22/2018   Total Score - - -   Total Score MyChart - 4 (Minimal depression) -   Total Score 2 4 4       Prescription approved per [facility] Refill Protocol or patient Primary care provider (PCP)  MEDARDO Jose  RN/Nic Calvin

## 2018-07-02 NOTE — TELEPHONE ENCOUNTER
Pt called wanting to know when meds would be ready to . She would like them by tomorrow.  Ally Quinones  CSS

## 2018-07-02 NOTE — TELEPHONE ENCOUNTER
"Requested Prescriptions   Pending Prescriptions Disp Refills     buPROPion (WELLBUTRIN XL) 300 MG 24 hr tablet [Pharmacy Med Name: BuPROPion HCl ER (XL) Oral Tablet Extended Release 24 Hour 300 MG] 30 tablet 0    Last Written Prescription Date:  03/22/2018 #90 x 5  Last filled 05/30/2017  Last office visit: 3/22/2018 WANDY Castro   Future Office Visit:  none   Sig: TAKE ONE TABLET BY MOUTH EVERY MORNING (ALONG WITH ONE 150MG TABLET FOR TOTAL DAILY DOSE 450MG)    SSRIs Protocol Passed    6/29/2018  9:43 PM       Passed - PHQ-9 score less than 5 in past 6 months    Please review last PHQ-9 score.   PHQ-9 SCORE 3/27/2017 7/29/2017 3/22/2018   Total Score - - -   Total Score MyChart - 4 (Minimal depression) -   Total Score 2 4 4       ANUSHA-7 SCORE 1/26/2015 3/22/2018   Total Score 3 -   Total Score - 1                Passed - Medication is Bupropion    If the medication is Bupropion (Wellbutrin), and the patient is taking for smoking cessation; OK to refill.         Passed - Patient is age 18 or older       Passed - No active pregnancy on record       Passed - No positive pregnancy test in last 12 months       Passed - Recent (6 mo) or future (30 days) visit within the authorizing provider's specialty    Patient had office visit in the last 6 months or has a visit in the next 30 days with authorizing provider or within the authorizing provider's specialty.  See \"Patient Info\" tab in inbasket, or \"Choose Columns\" in Meds & Orders section of the refill encounter.              "

## 2018-07-02 NOTE — TELEPHONE ENCOUNTER
Received a PA request from [facility] [place] for Bupropion XL 150mg    Routed request to the [email or web address] epa team.                Al Purvis RT (r)  [facility]

## 2018-07-03 NOTE — TELEPHONE ENCOUNTER
Prior Authorization Approval    Authorization Effective Date: 7/2/2018  Authorization Expiration Date: 7/3/2019  Medication: Buproprion XL 150mg approved   Approved Dose/Quantity:   Reference #:     Insurance Company: [employer] - Phone [phone] Fax [phone]  Expected CoPay:       CoPay Card Available:      Foundation Assistance Needed:    Which Pharmacy is filling the prescription (Not needed for infusion/clinic administered): [facility][id number] - [place] [[facility] [place] - [address]  Pharmacy Notified: Yes  Patient Notified: Yes

## 2018-07-03 NOTE — TELEPHONE ENCOUNTER
Follow up the pharmacy, medication is waiting to be picked up with a co pay of $1.00.    Al Purvis RT (r)  [facility]

## 2018-07-03 NOTE — TELEPHONE ENCOUNTER
PA Initiation    Medication: Buproprion XL 150mg  Insurance Company: [employer] - Phone [phone] Fax [phone]  Pharmacy Filling the Rx: [facility][id number] - [place] [[facility] [place]  [address]  Filling Pharmacy Phone: [phone]  Filling Pharmacy Fax:    Start Date: 7/3/2018    THIS HAS BEEN SUBMITTED BY THE PRIOR-AUTHORIZATION TEAM. ANY QUESTIONS PLEASE CALL [phone]. THANK YOU

## 2018-07-11 NOTE — TELEPHONE ENCOUNTER
PHQ-9 SCORE 3/27/2017 7/29/2017 3/22/2018   Total Score - - -   Total Score MyChart - 4 (Minimal depression) -   Total Score 2 4 4     Review of chart appears RX was covered by insurance   MEDARDO Jose  RN/Nic Calvin

## 2018-08-23 ENCOUNTER — TELEPHONE (OUTPATIENT)
Dept: FAMILY MEDICINE | Facility: CLINIC | Age: 63
End: 2018-08-23

## 2018-08-23 NOTE — TELEPHONE ENCOUNTER
8/23/2018    Call Regarding VIP Mammogram    Attempt 1    Message on voicemail    Patient is also due for: Preventive Health Screening Colonoscopy    Outreach   SV

## 2018-09-04 NOTE — TELEPHONE ENCOUNTER
9/4/2018    Attempt 2    Contacted patient in regards to scheduling VIP mammogram  Message on voicemail     Patient is also due for - Preventive Health Screening Colonoscopy    Comments:       Outreach   AT

## 2018-09-28 DIAGNOSIS — F33.1 MAJOR DEPRESSIVE DISORDER, RECURRENT EPISODE, MODERATE (H): ICD-10-CM

## 2018-10-01 NOTE — TELEPHONE ENCOUNTER
"Requested Prescriptions   Pending Prescriptions Disp Refills     citalopram (CELEXA) 40 MG tablet [Pharmacy Med Name: Citalopram Hydrobromide Oral Tablet 40 MG] 30 tablet 4    Last Written Prescription Date:  03/22/2018 #30 x 5  Last filled 08/27/2018  Last office visit: 3/22/2018 WANDY Castro   Future Office Visit:  None   Sig: TAKE 1 TABLET BY MOUTH EVERYDAY    SSRIs Protocol Failed    9/28/2018  6:16 PM       Failed - PHQ-9 score less than 5 in past 6 months    Please review last PHQ-9 score.   PHQ-9 SCORE 3/27/2017 7/29/2017 3/22/2018   Total Score - - -   Total Score MyChart - 4 (Minimal depression) -   Total Score 2 4 4       ANUHSA-7 SCORE 1/26/2015 3/22/2018   Total Score 3 -   Total Score - 1                Failed - Recent (6 mo) or future (30 days) visit within the authorizing provider's specialty    Patient had office visit in the last 6 months or has a visit in the next 30 days with authorizing provider or within the authorizing provider's specialty.  See \"Patient Info\" tab in inbasket, or \"Choose Columns\" in Meds & Orders section of the refill encounter.           Passed - Patient is age 18 or older       Passed - No active pregnancy on record       Passed - No positive pregnancy test in last 12 months          "

## 2018-10-02 ENCOUNTER — MYC MEDICAL ADVICE (OUTPATIENT)
Dept: FAMILY MEDICINE | Facility: CLINIC | Age: 63
End: 2018-10-02

## 2018-10-02 RX ORDER — CITALOPRAM HYDROBROMIDE 40 MG/1
TABLET ORAL
Qty: 30 TABLET | Refills: 0 | Status: SHIPPED | OUTPATIENT
Start: 2018-10-02 | End: 2018-10-23 | Stop reason: ALTCHOICE

## 2018-10-02 ASSESSMENT — PATIENT HEALTH QUESTIONNAIRE - PHQ9
10. IF YOU CHECKED OFF ANY PROBLEMS, HOW DIFFICULT HAVE THESE PROBLEMS MADE IT FOR YOU TO DO YOUR WORK, TAKE CARE OF THINGS AT HOME, OR GET ALONG WITH OTHER PEOPLE: NOT DIFFICULT AT ALL
SUM OF ALL RESPONSES TO PHQ QUESTIONS 1-9: 6
SUM OF ALL RESPONSES TO PHQ QUESTIONS 1-9: 6

## 2018-10-02 NOTE — TELEPHONE ENCOUNTER
Routing refill request to provider for review/approval because:  Due for surveys. Sent via My Chart. Jyotsna Shaw RN

## 2018-10-03 ASSESSMENT — PATIENT HEALTH QUESTIONNAIRE - PHQ9: SUM OF ALL RESPONSES TO PHQ QUESTIONS 1-9: 6

## 2018-10-03 NOTE — TELEPHONE ENCOUNTER
Left msg for pt to [facility] to schedule follow-up.    Tere Siddiqi, [place]

## 2018-10-12 ENCOUNTER — TELEPHONE (OUTPATIENT)
Dept: FAMILY MEDICINE | Facility: CLINIC | Age: 63
End: 2018-10-12

## 2018-10-12 NOTE — TELEPHONE ENCOUNTER
Patient is mad and does not understand why she has to come in for an appt every 6 months to get refills on her medications.    [facility]id [facility]enez [facility]

## 2018-10-12 NOTE — TELEPHONE ENCOUNTER
Called and left message.  Pt last seen 3/22/18.  PHQ-9 last done 10/2/18 with score of 6.  Per Yuli, pt needs q6mo appointment per policy.  Pt was due in Sept.      Estela Baker, RN      Ian note sent to patient  With Phq9 attached and Matthew Shukla  Clinic  RN/Nic Calvin

## 2018-10-23 ENCOUNTER — OFFICE VISIT (OUTPATIENT)
Dept: FAMILY MEDICINE | Facility: CLINIC | Age: 63
End: 2018-10-23
Payer: COMMERCIAL

## 2018-10-23 VITALS
HEIGHT: 66 IN | SYSTOLIC BLOOD PRESSURE: 120 MMHG | WEIGHT: 187.8 LBS | DIASTOLIC BLOOD PRESSURE: 78 MMHG | TEMPERATURE: 98 F | BODY MASS INDEX: 30.18 KG/M2 | HEART RATE: 68 BPM

## 2018-10-23 DIAGNOSIS — Z23 NEED FOR VACCINATION: ICD-10-CM

## 2018-10-23 DIAGNOSIS — F33.1 MAJOR DEPRESSIVE DISORDER, RECURRENT EPISODE, MODERATE (H): Primary | ICD-10-CM

## 2018-10-23 PROCEDURE — 90715 TDAP VACCINE 7 YRS/> IM: CPT | Performed by: FAMILY MEDICINE

## 2018-10-23 PROCEDURE — 99214 OFFICE O/P EST MOD 30 MIN: CPT | Mod: 25 | Performed by: FAMILY MEDICINE

## 2018-10-23 PROCEDURE — 90471 IMMUNIZATION ADMIN: CPT | Performed by: FAMILY MEDICINE

## 2018-10-23 RX ORDER — FLUOXETINE 10 MG/1
10 CAPSULE ORAL DAILY
Qty: 60 CAPSULE | Refills: 1 | Status: SHIPPED | OUTPATIENT
Start: 2018-10-23 | End: 2018-10-24

## 2018-10-23 NOTE — PROGRESS NOTES
"  SUBJECTIVE:   Enid Sexton is a 63 year old female who presents to clinic today for the following health issues:      Depression Followup    Status since last visit: Worsened for > 6 months    See PHQ-9 for current symptoms.  Other associated symptoms: None    Complicating factors:   Significant life event:  No   Current substance abuse:  None  Anxiety or Panic symptoms:  No    PHQ 7/29/2017 3/22/2018 10/2/2018   PHQ-9 Total Score 4 4 6   Q9: Suicide Ideation Not at all Not at all Not at all     In the past two weeks have you had thoughts of suicide or self-harm?  No.    Do you have concerns about your personal safety or the safety of others?   No  PHQ-9  English  PHQ-9   Any Language  Suicide Assessment Five-step Evaluation and Treatment (SAFE-T)    Amount of exercise or physical activity: 2-3 days/week for an average of 30-45 minutes    Problems taking medications regularly: No    Medication side effects: none    Diet: regular (no restrictions)    Feels she gets angry easily and has trouble getting out of bed in the AM.    Has been treated for depression for years.  On the current meds for \"quite a while\".  Frustrated that she has to take so many pills and they do not seem to work well for her.  States sometimes she takes an extra wellbutrin to try to help with her energy.  Wants to cut back on her medicines and just take 1 that works.    Has been on many different medications for mood.  Thinks zoloft made her fingers numb but cannot really recall other medications or side effects.      Did counseling once in the distant past.  Has no interested in counseling.  States she would have nothing to say and it would not be helpful for her at all.    Problem list and histories reviewed & adjusted, as indicated.  Additional history: as documented    Reviewed and updated as needed this visit by clinical staff  Tobacco  Allergies  Meds  Med Hx  Surg Hx  Fam Hx  Soc Hx      Reviewed and updated as needed this visit by " Provider  Tobacco  Med Hx  Surg Hx  Fam Hx  Soc Hx        ROS: Remainder of Constitutional, CV, Respiratory, GI,  negative with exception of that mentioned above    PE:  VS as above   Gen:  WN/WD/WH female in NAD   Psych: Alert and oriented times 3; coherent speech, normal   rate and volume, able to articulate logical thoughts, able   to abstract reason, no tangential thoughts, no hallucinations   or delusions  Her affect is mildly depressed and irritable    A/P:      ICD-10-CM    1. Major depressive disorder, recurrent episode, moderate (H) F33.1 DISCONTINUED: FLUoxetine (PROZAC) 10 MG capsule   2. Need for vaccination Z23 TDAP, IM (10 - 64 YRS) - Adacel     ADMIN 1st VACCINE     Patient Instructions   We'll plan on switching over your citalopram to fluoxetine.  When you are coming to the end of your citalopram prescription you can decrease the dose by cutting the tablet in half so you are taking 20mg daily.  Please do this for 4 days then stop.    When you decrease the citalopram dose to 20mg you can also begin the fluoxetine 10mg once daily.  After 4 days (when you've stopped the citalopram) increase the fluoxetine to 20mg daily    We should follow up in 6 weeks to see how things are going.  An Evisit would be fine for this.    Please let me know if you have any problems with the change over.

## 2018-10-23 NOTE — MR AVS SNAPSHOT
After Visit Summary   10/23/2018    Enid Sexton    MRN: [id number]           Patient Information     Date Of Birth          1955        Visit Information        Provider Department      10/23/2018 11:20 AM Nithya Oswald DO [facility]        Today's Diagnoses     Need for vaccination    -  1    Major depressive disorder, recurrent episode, moderate (H)          Care Instructions    We'll plan on switching over your citalopram to fluoxetine.  When you are coming to the end of your citalopram prescription you can decrease the dose by cutting the tablet in half so you are taking 20mg daily.  Please do this for 4 days then stop.    When you decrease the citalopram dose to 20mg you can also begin the fluoxetine 10mg once daily.  After 4 days (when you've stopped the citalopram) increase the fluoxetine to 20mg daily    We should follow up in 6 weeks to see how things are going.  An Evisit would be fine for this.    Please let me know if you have any problems with the change over.          Follow-ups after your visit        Follow-up notes from your care team     Return in about 6 weeks (around 12/4/2018) for Evisit for mood.      Who to contact     Normal or non-critical lab and imaging results will be communicated to you by [employer], letter or phone within 4 business days after the clinic has received the results. If you do not hear from us within 7 days, please contact the clinic through [employer] or phone. If you have a critical or abnormal lab result, we will notify you by phone as soon as possible.  Submit refill requests through [employer] or call your pharmacy and they will forward the refill request to us. Please allow 3 business days for your refill to be completed.          If you need to speak with a  for additional information , please call: [phone]           Additional Information About Your Visit        [employer] Information     [employer] gives you secure  "access to your electronic health record. If you see a primary care provider, you can also send messages to your care team and make appointments. If you have questions, please call your primary care clinic.  If you do not have a primary care provider, please call [phone] and they will assist you.        Care EveryWhere ID     This is your Care EveryWhere ID. This could be used by other organizations to access your [place] medical records  [id number]        Your Vitals Were     Pulse Temperature Height Breastfeeding? BMI (Body Mass Index)       68 98  F (36.7  C) (Tympanic) 5' 5.5\" (1.664 m) No 30.78 kg/m2        Blood Pressure from Last 3 Encounters:   10/23/18 120/78   03/22/18 126/78   04/25/17 118/80    Weight from Last 3 Encounters:   10/23/18 187 lb 12.8 oz (85.2 kg)   03/22/18 187 lb 12.8 oz (85.2 kg)   04/25/17 186 lb 3.2 oz (84.5 kg)              We Performed the Following     ADMIN 1st VACCINE     TDAP, IM (10 - 64 YRS) - Adacel          Today's Medication Changes          These changes are accurate as of 10/23/18 12:08 PM.  If you have any questions, ask your nurse or doctor.               Start taking these medicines.        Dose/Directions    FLUoxetine 10 MG capsule   Commonly known as:  PROzac   Used for:  Major depressive disorder, recurrent episode, moderate (H)   Started by:  Nithya Oswald DO        Dose:  10 mg   Take 1 capsule (10 mg) by mouth daily For 4 days then increase to 20mg daily   Quantity:  60 capsule   Refills:  1         Stop taking these medicines if you haven't already. Please contact your care team if you have questions.     citalopram 40 MG tablet   Commonly known as:  celeXA   Stopped by:  Nithya Oswald DO                Where to get your medicines      These medications were sent to [id number] - [place] [[facility] [place] - [address]  [address] [address]     Phone:  [phone]     FLUoxetine 10 MG capsule                " Primary Care Provider Office Phone # Fax #    Yuli Castro, APRN [facility] [phone] [address] [place] [address]        Equal Access to Services     VIPIN EASON: Hadii aad ku hadnicoláso Sogonzales, waaxda luqadaha, qaybta kaalmada adeegyada, umberto chaparro alisejeanne gentile nicho eason. So [facility] [phone].    ATENCIÓN: Si habla español, tiene a chiu disposición servicios gratuitos de asistencia lingüística. Llame al [phone].    We comply with applicable federal civil rights laws and [place] laws. We do not discriminate on the basis of race, color, national origin, age, disability, sex, sexual orientation, or gender identity.            Thank you!     Thank you for choosing [facility]  for your care. Our goal is always to provide you with excellent care. Hearing back from our patients is one way we can continue to improve our services. Please take a few minutes to complete the written survey that you may receive in the mail after your visit with us. Thank you!             Your Updated Medication List - Protect others around you: Learn how to safely use, store and throw away your medicines at www.disposemymeds.org.          This list is accurate as of 10/23/18 12:08 PM.  Always use your most recent med list.                   Brand Name Dispense Instructions for use Diagnosis    aspirin 325 MG tablet      Take by mouth daily        buPROPion 150 MG 24 hr tablet    WELLBUTRIN XL    90 tablet    TAKE THREE TABLETS BY MOUTH IN THE MORNING total dose 450mg per day    Major depressive disorder, recurrent episode, moderate (H)       CALCIUM CITRATE + D PO      Take by mouth as needed 2 times per week        DAILY MULTIVITAMIN PO      Take by mouth daily Reported on 3/27/2017        FLUoxetine 10 MG capsule    PROzac    60 capsule    Take 1 capsule (10 mg) by mouth daily For 4 days then increase to 20mg daily    Major depressive disorder, recurrent episode, moderate (H)        IRON PO      Take by mouth daily        levothyroxine 88 MCG tablet    SYNTHROID/LEVOTHROID    90 tablet    Take 1 tablet (88 mcg) by mouth daily    Hypothyroidism due to acquired atrophy of thyroid       vitamin B complex with vitamin C Tabs tablet      Take 1 tablet by mouth daily

## 2018-10-23 NOTE — PATIENT INSTRUCTIONS
We'll plan on switching over your citalopram to fluoxetine.  When you are coming to the end of your citalopram prescription you can decrease the dose by cutting the tablet in half so you are taking 20mg daily.  Please do this for 4 days then stop.    When you decrease the citalopram dose to 20mg you can also begin the fluoxetine 10mg once daily.  After 4 days (when you've stopped the citalopram) increase the fluoxetine to 20mg daily    We should follow up in 6 weeks to see how things are going.  An Evisit would be fine for this.    Please let me know if you have any problems with the change over.

## 2018-10-23 NOTE — NURSING NOTE
"Initial /78  Pulse 68  Temp 98  F (36.7  C) (Tympanic)  Ht 5' 5.5\" (1.664 m)  Wt 187 lb 12.8 oz (85.2 kg)  Breastfeeding? No  BMI 30.78 kg/m2 Estimated body mass index is 30.78 kg/(m^2) as calculated from the following:    Height as of this encounter: 5' 5.5\" (1.664 m).    Weight as of this encounter: 187 lb 12.8 oz (85.2 kg). .      "

## 2018-10-24 ENCOUNTER — TELEPHONE (OUTPATIENT)
Dept: FAMILY MEDICINE | Facility: CLINIC | Age: 63
End: 2018-10-24

## 2018-10-24 DIAGNOSIS — F33.1 MAJOR DEPRESSIVE DISORDER, RECURRENT EPISODE, MODERATE (H): ICD-10-CM

## 2018-10-24 RX ORDER — FLUOXETINE 10 MG/1
10 CAPSULE ORAL DAILY
Qty: 60 CAPSULE | Refills: 1 | Status: SHIPPED | OUTPATIENT
Start: 2018-10-24 | End: 2019-02-18 | Stop reason: DRUGHIGH

## 2018-10-24 NOTE — TELEPHONE ENCOUNTER
"FAx from Pharm:    \" plan limitations exceeded- rejected due to quantity limites; limited to 1 cap daily. Please send new Rx for 20mg caps. Will be filling an Rx for #30 10 mg caps to get pt started, call if questions. Thanks\"    Requested Prescriptions   Pending Prescriptions Disp Refills     FLUoxetine (PROZAC) 10 MG capsule 60 capsule 1     Sig: Take 1 capsule (10 mg) by mouth daily For 4 days then increase to 20mg daily              "

## 2018-11-24 ENCOUNTER — NURSE TRIAGE (OUTPATIENT)
Dept: NURSING | Facility: CLINIC | Age: 63
End: 2018-11-24

## 2018-11-24 NOTE — TELEPHONE ENCOUNTER
Reason for call; Discuss with Pt , she would like prozac Rx  Order changed to 20mg instead of 10mg and sent to use  [place]   [phone]   Recommendation / teaching ; Pt declines phone call message to Nithya Oswald, DO Family Practice at [facility] , and Pt wants to call back on 11/26/18 herself .      Caller Verbalizes understanding and denies further questions and will call back if further symptoms to triage or questions  .  Yue Loving RN  - [place] Nurse Advisor

## 2018-11-26 DIAGNOSIS — F33.1 MAJOR DEPRESSIVE DISORDER, RECURRENT EPISODE, MODERATE (H): Primary | ICD-10-CM

## 2018-11-26 NOTE — TELEPHONE ENCOUNTER
Prescription approved per [facility] Refill Protocol or patient Primary care provider (PCP)  MEDARDO Jose RN/Nic Calvin

## 2018-12-06 ENCOUNTER — MYC MEDICAL ADVICE (OUTPATIENT)
Dept: FAMILY MEDICINE | Facility: CLINIC | Age: 63
End: 2018-12-06

## 2018-12-07 NOTE — TELEPHONE ENCOUNTER
12/7/2018    Attempt 3    Contacted patient in regards to scheduling VIP mammogram  Message on voicemail     Patient is also due for - Preventive Health Screening Colonoscopy    Comments:       Outreach   CC

## 2018-12-18 ASSESSMENT — ANXIETY QUESTIONNAIRES
5. BEING SO RESTLESS THAT IT IS HARD TO SIT STILL: NOT AT ALL
6. BECOMING EASILY ANNOYED OR IRRITABLE: MORE THAN HALF THE DAYS
7. FEELING AFRAID AS IF SOMETHING AWFUL MIGHT HAPPEN: NOT AT ALL
3. WORRYING TOO MUCH ABOUT DIFFERENT THINGS: NOT AT ALL
GAD7 TOTAL SCORE: 2
1. FEELING NERVOUS, ANXIOUS, OR ON EDGE: NOT AT ALL
2. NOT BEING ABLE TO STOP OR CONTROL WORRYING: NOT AT ALL

## 2018-12-18 ASSESSMENT — PATIENT HEALTH QUESTIONNAIRE - PHQ9
SUM OF ALL RESPONSES TO PHQ QUESTIONS 1-9: 4
5. POOR APPETITE OR OVEREATING: NOT AT ALL

## 2018-12-19 ASSESSMENT — ANXIETY QUESTIONNAIRES: GAD7 TOTAL SCORE: 2

## 2019-01-10 ENCOUNTER — ANCILLARY PROCEDURE (OUTPATIENT)
Dept: MAMMOGRAPHY | Facility: CLINIC | Age: 64
End: 2019-01-10
Payer: COMMERCIAL

## 2019-01-10 DIAGNOSIS — Z12.31 VISIT FOR SCREENING MAMMOGRAM: ICD-10-CM

## 2019-01-10 PROCEDURE — 77067 SCR MAMMO BI INCL CAD: CPT | Mod: TC

## 2019-02-17 ENCOUNTER — MYC MEDICAL ADVICE (OUTPATIENT)
Dept: FAMILY MEDICINE | Facility: CLINIC | Age: 64
End: 2019-02-17

## 2019-02-17 DIAGNOSIS — F33.1 MAJOR DEPRESSIVE DISORDER, RECURRENT EPISODE, MODERATE (H): ICD-10-CM

## 2019-02-17 DIAGNOSIS — F33.1 MAJOR DEPRESSIVE DISORDER, RECURRENT EPISODE, MODERATE (H): Primary | ICD-10-CM

## 2019-02-17 DIAGNOSIS — E03.4 HYPOTHYROIDISM DUE TO ACQUIRED ATROPHY OF THYROID: ICD-10-CM

## 2019-02-18 DIAGNOSIS — E03.4 HYPOTHYROIDISM DUE TO ACQUIRED ATROPHY OF THYROID: ICD-10-CM

## 2019-02-18 RX ORDER — BUPROPION HYDROCHLORIDE 150 MG/1
300 TABLET ORAL EVERY MORNING
Qty: 180 TABLET | Refills: 0 | Status: SHIPPED | OUTPATIENT
Start: 2019-02-18 | End: 2019-03-26

## 2019-02-18 RX ORDER — FLUOXETINE 40 MG/1
40 CAPSULE ORAL DAILY
Qty: 90 CAPSULE | Refills: 0 | Status: SHIPPED | OUTPATIENT
Start: 2019-02-18 | End: 2019-04-30 | Stop reason: DRUGHIGH

## 2019-02-18 NOTE — TELEPHONE ENCOUNTER
"Requested Prescriptions   Pending Prescriptions Disp Refills     FLUoxetine (PROZAC) 20 MG capsule [Pharmacy Med Name: FLUOXETINE 20MG     CAP] 90 capsule 0    Last Written Prescription Date:  11/26/2018 #90 x 0  Last filled 11/26/2018  Last office visit: 10/23/2018 WANDY Oswald   Future Office Visit: None     Sig: TAKE 1 CAPSULE BY MOUTH ONCE DAILY    SSRIs Protocol Passed - 2/17/2019  6:30 AM       Passed - PHQ-9 score less than 5 in past 6 months    Please review last PHQ-9 score.   PHQ-9 SCORE 3/22/2018 10/2/2018 12/18/2018   PHQ-9 Total Score - - -   PHQ-9 Total Score MyChart - 6 (Mild depression) -   PHQ-9 Total Score 4 6 4       ANUSHA-7 SCORE 1/26/2015 3/22/2018 12/18/2018   Total Score 3 - -   Total Score - 1 2                Passed - Medication is active on med list       Passed - Patient is age 18 or older       Passed - No active pregnancy on record       Passed - No positive pregnancy test in last 12 months       Passed - Recent (6 mo) or future (30 days) visit within the authorizing provider's specialty    Patient had office visit in the last 6 months or has a visit in the next 30 days with authorizing provider or within the authorizing provider's specialty.  See \"Patient Info\" tab in inbasket, or \"Choose Columns\" in Meds & Orders section of the refill encounter.              "

## 2019-02-19 ENCOUNTER — TELEPHONE (OUTPATIENT)
Dept: FAMILY MEDICINE | Facility: CLINIC | Age: 64
End: 2019-02-19

## 2019-02-19 NOTE — TELEPHONE ENCOUNTER
Dr. Oswald,  Looks like the patient is now to take the Prozac 40 mg per your my chart, refill was sent today for 20 mg, did you want to discontinue that one?    MARI Paige

## 2019-02-19 NOTE — TELEPHONE ENCOUNTER
Prescription approved per [facility] Refill Protocol.  Jyotsna Shaw RN

## 2019-02-19 NOTE — TELEPHONE ENCOUNTER
Yes, please call pt and pharmacy and discharge 20mg dose.  Filled for dose increase of 40mg yesterday.    Nithya Oswald

## 2019-02-19 NOTE — TELEPHONE ENCOUNTER
See my chart sent on 2-17-19. Lab set up for TSH on 3-4-19, patient called and says has plenty of medication till labs, will hold the refill pending the lab.     MARI Paige

## 2019-03-04 DIAGNOSIS — E03.4 HYPOTHYROIDISM DUE TO ACQUIRED ATROPHY OF THYROID: ICD-10-CM

## 2019-03-04 LAB — TSH SERPL DL<=0.005 MIU/L-ACNC: 2.33 MU/L (ref 0.4–4)

## 2019-03-04 PROCEDURE — 36415 COLL VENOUS BLD VENIPUNCTURE: CPT | Performed by: FAMILY MEDICINE

## 2019-03-04 PROCEDURE — 84443 ASSAY THYROID STIM HORMONE: CPT | Performed by: FAMILY MEDICINE

## 2019-03-07 RX ORDER — LEVOTHYROXINE SODIUM 88 UG/1
88 TABLET ORAL DAILY
Qty: 90 TABLET | Refills: 3 | Status: SHIPPED | OUTPATIENT
Start: 2019-03-07 | End: 2019-03-14

## 2019-03-14 DIAGNOSIS — E03.4 HYPOTHYROIDISM DUE TO ACQUIRED ATROPHY OF THYROID: ICD-10-CM

## 2019-03-14 RX ORDER — LEVOTHYROXINE SODIUM 88 UG/1
TABLET ORAL
Qty: 90 TABLET | Refills: 3 | Status: SHIPPED | OUTPATIENT
Start: 2019-03-14 | End: 2019-04-30

## 2019-03-14 NOTE — TELEPHONE ENCOUNTER
TSH   Date Value Ref Range Status   03/04/2019 2.33 0.40 - 4.00 mU/L Final       Prescription approved per [facility] Refill Protocol or patient Primary care provider (PCP)  MEDARDO Jose  RN/Nic Calvin

## 2019-03-23 ASSESSMENT — ENCOUNTER SYMPTOMS
HEMATURIA: 0
CHILLS: 0
DYSURIA: 0
PARESTHESIAS: 0
SORE THROAT: 0
HEADACHES: 1
ABDOMINAL PAIN: 0
BREAST MASS: 0
SHORTNESS OF BREATH: 0
JOINT SWELLING: 0
MYALGIAS: 0
CONSTIPATION: 0
NAUSEA: 0
FEVER: 0
HEARTBURN: 0
DIARRHEA: 0
EYE PAIN: 0
COUGH: 0
FREQUENCY: 1
DIZZINESS: 0
PALPITATIONS: 0
HEMATOCHEZIA: 0
NERVOUS/ANXIOUS: 0
ARTHRALGIAS: 0
WEAKNESS: 0

## 2019-03-26 ENCOUNTER — OFFICE VISIT (OUTPATIENT)
Dept: FAMILY MEDICINE | Facility: CLINIC | Age: 64
End: 2019-03-26
Payer: COMMERCIAL

## 2019-03-26 VITALS
TEMPERATURE: 97.9 F | HEART RATE: 54 BPM | SYSTOLIC BLOOD PRESSURE: 139 MMHG | WEIGHT: 190.8 LBS | HEIGHT: 66 IN | DIASTOLIC BLOOD PRESSURE: 74 MMHG | BODY MASS INDEX: 30.67 KG/M2

## 2019-03-26 DIAGNOSIS — G47.19 EXCESSIVE DAYTIME SLEEPINESS: ICD-10-CM

## 2019-03-26 DIAGNOSIS — F33.1 MAJOR DEPRESSIVE DISORDER, RECURRENT EPISODE, MODERATE (H): ICD-10-CM

## 2019-03-26 DIAGNOSIS — R19.5 POSITIVE FIT (FECAL IMMUNOCHEMICAL TEST): ICD-10-CM

## 2019-03-26 DIAGNOSIS — Z00.01 ENCOUNTER FOR ROUTINE ADULT MEDICAL EXAM WITH ABNORMAL FINDINGS: Primary | ICD-10-CM

## 2019-03-26 DIAGNOSIS — Z13.6 CARDIOVASCULAR SCREENING; LDL GOAL LESS THAN 160: ICD-10-CM

## 2019-03-26 PROCEDURE — 99213 OFFICE O/P EST LOW 20 MIN: CPT | Mod: 25 | Performed by: FAMILY MEDICINE

## 2019-03-26 PROCEDURE — 99396 PREV VISIT EST AGE 40-64: CPT | Performed by: FAMILY MEDICINE

## 2019-03-26 RX ORDER — BUPROPION HYDROCHLORIDE 150 MG/1
150 TABLET ORAL EVERY MORNING
Qty: 180 TABLET | Refills: 0 | COMMUNITY
Start: 2019-03-26 | End: 2019-04-30

## 2019-03-26 ASSESSMENT — ANXIETY QUESTIONNAIRES
6. BECOMING EASILY ANNOYED OR IRRITABLE: SEVERAL DAYS
2. NOT BEING ABLE TO STOP OR CONTROL WORRYING: NOT AT ALL
1. FEELING NERVOUS, ANXIOUS, OR ON EDGE: NOT AT ALL
7. FEELING AFRAID AS IF SOMETHING AWFUL MIGHT HAPPEN: NOT AT ALL
3. WORRYING TOO MUCH ABOUT DIFFERENT THINGS: NOT AT ALL
GAD7 TOTAL SCORE: 1
5. BEING SO RESTLESS THAT IT IS HARD TO SIT STILL: NOT AT ALL

## 2019-03-26 ASSESSMENT — PATIENT HEALTH QUESTIONNAIRE - PHQ9
SUM OF ALL RESPONSES TO PHQ QUESTIONS 1-9: 7
5. POOR APPETITE OR OVEREATING: NOT AT ALL

## 2019-03-26 ASSESSMENT — MIFFLIN-ST. JEOR: SCORE: 1424.27

## 2019-03-26 NOTE — PATIENT INSTRUCTIONS
Please schedule with the sleep doctor to review your sleepiness and see if they think sleep apnea might be an issue for you    Please schedule the colonoscopy as soon as you are able.  This is very important to address given the positive FIT test in the past.    I would recommend fasting labs this year.  You will need to be fasting for 12 hours (nothing but water) prior to your blood work.  The orders are available if you would like to have those done.    Let me know if you feel like we need to go up on the fluoxetine.    Preventive Health Recommendations  Female Ages 50 - 64    Yearly exam: See your health care provider every year in order to  o Review health changes.   o Discuss preventive care.    o Review your medicines if your doctor has prescribed any.      Get a Pap test every three years (unless you have an abnormal result and your provider advises testing more often).    If you get Pap tests with HPV test, you only need to test every 5 years, unless you have an abnormal result.     You do not need a Pap test if your uterus was removed (hysterectomy) and you have not had cancer.    You should be tested each year for STDs (sexually transmitted diseases) if you're at risk.     Have a mammogram every 1 to 2 years.    Have a colonoscopy at age 50, or have a yearly FIT test (stool test). These exams screen for colon cancer.      Have a cholesterol test every 5 years, or more often if advised.    Have a diabetes test (fasting glucose) every three years. If you are at risk for diabetes, you should have this test more often.     If you are at risk for osteoporosis (brittle bone disease), think about having a bone density scan (DEXA).    Shots: Get a flu shot each year. Get a tetanus shot every 10 years.    Nutrition:     Eat at least 5 servings of fruits and vegetables each day.    Eat whole-grain bread, whole-wheat pasta and brown rice instead of white grains and rice.    Get adequate Calcium and Vitamin D.      Lifestyle    Exercise at least 150 minutes a week (30 minutes a day, 5 days a week). This will help you control your weight and prevent disease.    Limit alcohol to one drink per day.    No smoking.     Wear sunscreen to prevent skin cancer.     See your dentist every six months for an exam and cleaning.    See your eye doctor every 1 to 2 years.
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
## 2019-03-27 ASSESSMENT — ANXIETY QUESTIONNAIRES: GAD7 TOTAL SCORE: 1

## 2019-04-03 ENCOUNTER — TELEPHONE (OUTPATIENT)
Dept: FAMILY MEDICINE | Facility: CLINIC | Age: 64
End: 2019-04-03

## 2019-04-03 NOTE — TELEPHONE ENCOUNTER
Reason for Call:  Procedure  has reached out X 3 and left detailed messages to call back to schedule diagnostic colonoscopy      Detailed comments: patient has failed to return calls     No further action necessary for procedure  at this time        Phone Number Patient can be reached at: Home number on file [phone] (home)    Best Time: NA    Can we leave a detailed message on this number? Not Applicable    Call taken on 4/3/2019 at 11:51 AM by Nicolette Morales

## 2019-04-29 ENCOUNTER — MYC MEDICAL ADVICE (OUTPATIENT)
Dept: FAMILY MEDICINE | Facility: CLINIC | Age: 64
End: 2019-04-29

## 2019-04-29 DIAGNOSIS — F33.1 MAJOR DEPRESSIVE DISORDER, RECURRENT EPISODE, MODERATE (H): Primary | ICD-10-CM

## 2019-04-29 DIAGNOSIS — E03.4 HYPOTHYROIDISM DUE TO ACQUIRED ATROPHY OF THYROID: ICD-10-CM

## 2019-04-30 ENCOUNTER — MYC MEDICAL ADVICE (OUTPATIENT)
Dept: FAMILY MEDICINE | Facility: CLINIC | Age: 64
End: 2019-04-30

## 2019-04-30 RX ORDER — LEVOTHYROXINE SODIUM 88 UG/1
88 TABLET ORAL DAILY
Qty: 90 TABLET | Refills: 3 | Status: SHIPPED | OUTPATIENT
Start: 2019-04-30 | End: 2020-01-09

## 2019-04-30 NOTE — TELEPHONE ENCOUNTER
Please cancel fluoxetine script sent to pt's local pharmacy.  She requested hard copies    Nithya Oswald

## 2019-05-01 DIAGNOSIS — F33.1 MAJOR DEPRESSIVE DISORDER, RECURRENT EPISODE, MODERATE (H): Primary | ICD-10-CM

## 2019-05-01 NOTE — PROGRESS NOTES
Patient is here today to  written prescription for Prozac with increase dose up to 60 mg daily and the RX is incorrect with 20 mg daily.  Patient's PCP is not here to get this changed.  I reviewed her recommendations and rewrote Rx for 60 mg daily with refill #270 and she will need to f/u in 4-6 weeks as discussed with Dr. Oswald.  Radha Mason NP on 5/1/2019 at 11:38 AM

## 2019-08-05 DIAGNOSIS — F33.1 MAJOR DEPRESSIVE DISORDER, RECURRENT EPISODE, MODERATE (H): ICD-10-CM

## 2019-08-05 NOTE — TELEPHONE ENCOUNTER
"Requested Prescriptions   Pending Prescriptions Disp Refills     FLUoxetine (PROZAC) 20 MG capsule  Last Written Prescription Date:  05/01/19  Last Fill Quantity: 270,  # refills: 0   Last office visit: 3/26/2019 with prescribing provider:  Nithya Oswald   Future Office Visit:     270 capsule 0     Sig: Take 3 capsules (60 mg) by mouth daily       SSRIs Protocol Failed - 8/5/2019  8:21 AM        Failed - PHQ-9 score less than 5 in past 6 months     Please review last PHQ-9 score.   PHQ-9 SCORE 10/2/2018 12/18/2018 3/26/2019   PHQ-9 Total Score - - -   PHQ-9 Total Score MyChart 6 (Mild depression) - -   PHQ-9 Total Score 6 4 7           Passed - Medication is active on med list        Passed - Patient is age 18 or older        Passed - No active pregnancy on record        Passed - No positive pregnancy test in last 12 months        Passed - Recent (6 mo) or future (30 days) visit within the authorizing provider's specialty     Patient had office visit in the last 6 months or has a visit in the next 30 days with authorizing provider or within the authorizing provider's specialty.  See \"Patient Info\" tab in inbasket, or \"Choose Columns\" in Meds & Orders section of the refill encounter.              "

## 2019-08-05 NOTE — TELEPHONE ENCOUNTER
Reviewed chart.  Patient was advised 4/30/19 to follow up in 4-6 weeks with evisit to discuss symptoms after increasing Fluoxetine dose.    Patient called today asking for Fluoxetine refill to be sent to [facility] mail order.  We message sent to patient to request evisit today before refill is sent.   Zenaida Olivarez RN

## 2019-08-06 NOTE — TELEPHONE ENCOUNTER
I sent 90 days so she does not run out but would liek the Evisit prior to next refill.    Nithya Oswald

## 2019-08-07 NOTE — TELEPHONE ENCOUNTER
Pt notified.  I stressed the point of being seen and or evisit.     Yessica Lin, [place]

## 2019-08-10 ENCOUNTER — E-VISIT (OUTPATIENT)
Dept: FAMILY MEDICINE | Facility: CLINIC | Age: 64
End: 2019-08-10
Payer: COMMERCIAL

## 2019-08-10 DIAGNOSIS — F33.1 MODERATE EPISODE OF RECURRENT MAJOR DEPRESSIVE DISORDER (H): Primary | ICD-10-CM

## 2019-08-10 PROCEDURE — 99444 ZZC PHYSICIAN ONLINE EVALUATION & MANAGEMENT SERVICE: CPT | Performed by: FAMILY MEDICINE

## 2019-08-10 ASSESSMENT — PATIENT HEALTH QUESTIONNAIRE - PHQ9
10. IF YOU CHECKED OFF ANY PROBLEMS, HOW DIFFICULT HAVE THESE PROBLEMS MADE IT FOR YOU TO DO YOUR WORK, TAKE CARE OF THINGS AT HOME, OR GET ALONG WITH OTHER PEOPLE: SOMEWHAT DIFFICULT
SUM OF ALL RESPONSES TO PHQ QUESTIONS 1-9: 5
SUM OF ALL RESPONSES TO PHQ QUESTIONS 1-9: 5

## 2019-08-11 ASSESSMENT — PATIENT HEALTH QUESTIONNAIRE - PHQ9: SUM OF ALL RESPONSES TO PHQ QUESTIONS 1-9: 5

## 2019-09-20 ENCOUNTER — MYC MEDICAL ADVICE (OUTPATIENT)
Dept: FAMILY MEDICINE | Facility: CLINIC | Age: 64
End: 2019-09-20

## 2019-09-20 DIAGNOSIS — F33.1 MAJOR DEPRESSIVE DISORDER, RECURRENT EPISODE, MODERATE (H): Primary | ICD-10-CM

## 2019-09-20 RX ORDER — ESCITALOPRAM OXALATE 10 MG/1
10 TABLET ORAL DAILY
Qty: 30 TABLET | Refills: 0 | Status: SHIPPED | OUTPATIENT
Start: 2019-09-20 | End: 2019-09-20

## 2019-09-20 RX ORDER — ESCITALOPRAM OXALATE 10 MG/1
10 TABLET ORAL DAILY
Qty: 90 TABLET | Refills: 0 | Status: SHIPPED | OUTPATIENT
Start: 2019-09-20 | End: 2019-10-25 | Stop reason: DRUGHIGH

## 2019-09-20 NOTE — TELEPHONE ENCOUNTER
Patient stopped by  requesting 90 day supply (as her insurance will only allow this).  Reprinted script for 90 days and she will f/u with PCP    Rosmery Perez PA-C

## 2019-09-24 DIAGNOSIS — F33.1 MAJOR DEPRESSIVE DISORDER, RECURRENT EPISODE, MODERATE (H): ICD-10-CM

## 2019-09-24 RX ORDER — ESCITALOPRAM OXALATE 10 MG/1
10 TABLET ORAL DAILY
Qty: 90 TABLET | Refills: 0 | Status: CANCELLED | OUTPATIENT
Start: 2019-09-24

## 2019-09-24 NOTE — TELEPHONE ENCOUNTER
"Requested Prescriptions   Pending Prescriptions Disp Refills     escitalopram (LEXAPRO) 10 MG tablet 90 tablet 0     Sig: Take 1 tablet (10 mg) by mouth daily Needs to be seen by provider for further refills  Last Written Prescription Date:  09/20/2019 #90 x 0 ([facility])  Last filled - not provided, mail order pharmacy, [facility]  Last office visit: 3/26/2019 WANDY Oswald  Future Office Visit:  None         SSRIs Protocol Failed - 9/24/2019  8:14 AM        Failed - PHQ-9 score less than 5 in past 6 months     Please review last PHQ-9 score.   PHQ-9 SCORE 12/18/2018 3/26/2019 8/10/2019   PHQ-9 Total Score - - -   PHQ-9 Total Score MyChart - - 5 (Mild depression)   PHQ-9 Total Score 4 7 5       ANUSHA-7 SCORE 3/22/2018 12/18/2018 3/26/2019   Total Score - - -   Total Score 1 2 1                 Failed - Recent (6 mo) or future (30 days) visit within the authorizing provider's specialty     Patient had office visit in the last 6 months or has a visit in the next 30 days with authorizing provider or within the authorizing provider's specialty.  See \"Patient Info\" tab in inbasket, or \"Choose Columns\" in Meds & Orders section of the refill encounter.            Passed - Medication is active on med list        Passed - Patient is age 18 or older        Passed - No active pregnancy on record        Passed - No positive pregnancy test in last 12 months          "

## 2019-09-26 NOTE — TELEPHONE ENCOUNTER
Routing to [facility] for review.    MARI Paige     1 yo with resolved abdominal pain here for second dose of Ceftriaxone

## 2019-10-25 ENCOUNTER — MYC MEDICAL ADVICE (OUTPATIENT)
Dept: FAMILY MEDICINE | Facility: CLINIC | Age: 64
End: 2019-10-25

## 2019-10-25 DIAGNOSIS — F33.1 MAJOR DEPRESSIVE DISORDER, RECURRENT EPISODE, MODERATE (H): Primary | ICD-10-CM

## 2019-10-28 RX ORDER — ESCITALOPRAM OXALATE 20 MG/1
20 TABLET ORAL DAILY
Qty: 90 TABLET | Refills: 0 | Status: SHIPPED | OUTPATIENT
Start: 2019-10-28 | End: 2020-01-09

## 2020-01-08 DIAGNOSIS — E03.4 HYPOTHYROIDISM DUE TO ACQUIRED ATROPHY OF THYROID: ICD-10-CM

## 2020-01-08 DIAGNOSIS — F33.1 MAJOR DEPRESSIVE DISORDER, RECURRENT EPISODE, MODERATE (H): ICD-10-CM

## 2020-01-08 NOTE — TELEPHONE ENCOUNTER
"Requested Prescriptions   Pending Prescriptions Disp Refills     levothyroxine (SYNTHROID/LEVOTHROID) 88 MCG tablet  Last Written Prescription Date:  4/30/19  Last Fill Quantity: 90,  # refills: 3   Last office visit: 3/26/2019 with prescribing provider:  mc   Future Office Visit:     90 tablet 3     Sig: Take 1 tablet (88 mcg) by mouth daily       Thyroid Protocol Passed - 1/8/2020  1:24 PM        Passed - Patient is 12 years or older        Passed - Recent (12 mo) or future (30 days) visit within the authorizing provider's specialty     Patient has had an office visit with the authorizing provider or a provider within the authorizing providers department within the previous 12 mos or has a future within next 30 days. See \"Patient Info\" tab in inbasket, or \"Choose Columns\" in Meds & Orders section of the refill encounter.              Passed - Medication is active on med list        Passed - Normal TSH on file in past 12 months     Recent Labs   Lab Test 03/04/19  1116   TSH 2.33              Passed - No active pregnancy on record     If patient is pregnant or has had a positive pregnancy test, please check TSH.          Passed - No positive pregnancy test in past 12 months     If patient is pregnant or has had a positive pregnancy test, please check TSH.          escitalopram (LEXAPRO) 20 MG tablet  Last Written Prescription Date:  10/28/19  Last Fill Quantity: 90,  # refills: 0   Last office visit: 3/26/2019 with prescribing provider:  mc   Future Office Visit:     90 tablet 0     Sig: Take 1 tablet (20 mg) by mouth daily       SSRIs Protocol Failed - 1/8/2020  1:24 PM        Failed - Recent (6 mo) or future (30 days) visit within the authorizing provider's specialty     Patient had office visit in the last 6 months or has a visit in the next 30 days with authorizing provider or within the authorizing provider's specialty.  See \"Patient Info\" tab in inbasket, or \"Choose Columns\" in Meds & Orders section of " the refill encounter.            Passed - PHQ-9 score less than 5 in past 6 months     Please review last PHQ-9 score.           Passed - Medication is active on med list        Passed - Patient is age 18 or older        Passed - No active pregnancy on record        Passed - No positive pregnancy test in last 12 months

## 2020-01-09 RX ORDER — ESCITALOPRAM OXALATE 20 MG/1
20 TABLET ORAL DAILY
Qty: 90 TABLET | Refills: 0 | Status: SHIPPED | OUTPATIENT
Start: 2020-01-09 | End: 2020-01-14

## 2020-01-09 RX ORDER — LEVOTHYROXINE SODIUM 88 UG/1
88 TABLET ORAL DAILY
Qty: 90 TABLET | Refills: 0 | Status: SHIPPED | OUTPATIENT
Start: 2020-01-09 | End: 2020-01-14

## 2020-01-09 NOTE — TELEPHONE ENCOUNTER
Prescriptions approved per [facility] Refill Protocol.   Last OV 3/26/19: Return in about 1 year (around 3/26/2020) for Physical Exam.     Hubskip message sent to khadijah CLOUD RN

## 2020-01-13 ENCOUNTER — TELEPHONE (OUTPATIENT)
Dept: FAMILY MEDICINE | Facility: CLINIC | Age: 65
End: 2020-01-13

## 2020-01-13 DIAGNOSIS — F33.1 MAJOR DEPRESSIVE DISORDER, RECURRENT EPISODE, MODERATE (H): ICD-10-CM

## 2020-01-13 DIAGNOSIS — E03.4 HYPOTHYROIDISM DUE TO ACQUIRED ATROPHY OF THYROID: ICD-10-CM

## 2020-01-13 NOTE — TELEPHONE ENCOUNTER
Patient is frustrated that her meds were not filled to get her to the end of March so she can do a yearly appt.  She says she wants another 90day fill because she now has to use a mail order and it takes longer to get to her.  The meds are lexapro and levothyroxine.      Brigid Jimenez, [facility]

## 2020-01-14 RX ORDER — ESCITALOPRAM OXALATE 20 MG/1
20 TABLET ORAL DAILY
Qty: 90 TABLET | Refills: 0 | Status: SHIPPED | OUTPATIENT
Start: 2020-01-14 | End: 2020-01-21

## 2020-01-14 RX ORDER — LEVOTHYROXINE SODIUM 88 UG/1
88 TABLET ORAL DAILY
Qty: 90 TABLET | Refills: 0 | Status: SHIPPED | OUTPATIENT
Start: 2020-01-14 | End: 2020-03-18

## 2020-01-14 NOTE — TELEPHONE ENCOUNTER
Spoke with patient, she requested another 90 day refill for levothyroxine and Lexapro to be sent to mail order pharmacy.  Writer assisted with scheduling yearly office visit on April 6 at 10:20am.

## 2020-01-21 DIAGNOSIS — F33.1 MAJOR DEPRESSIVE DISORDER, RECURRENT EPISODE, MODERATE (H): ICD-10-CM

## 2020-01-21 RX ORDER — ESCITALOPRAM OXALATE 20 MG/1
TABLET ORAL
Qty: 90 TABLET | Refills: 0 | Status: SHIPPED | OUTPATIENT
Start: 2020-01-21 | End: 2020-03-18

## 2020-01-21 NOTE — TELEPHONE ENCOUNTER
"Requested Prescriptions   Pending Prescriptions Disp Refills     escitalopram (LEXAPRO) 20 MG tablet [Pharmacy Med Name: ESCITALOPRAM 20MG TABLETS] 90 tablet 0     Sig: TAKE 1 TABLET(20 MG) BY MOUTH DAILY  Last Written Prescription Date:  01/14/2020 #90 x 0  Last filled - not provided  Last office visit: 3/26/2019 WANDY Oswald     Future Office Visit:   Next 5 appointments (look out 90 days)    Apr 06, 2020 10:20 AM CDT  SHORT with Nithya Oswald,   [facility] [address] [address]  [phone]                SSRIs Protocol Failed - 1/21/2020  5:27 AM        Failed - Recent (6 mo) or future (30 days) visit within the authorizing provider's specialty     Patient had office visit in the last 6 months or has a visit in the next 30 days with authorizing provider or within the authorizing provider's specialty.  See \"Patient Info\" tab in inbasket, or \"Choose Columns\" in Meds & Orders section of the refill encounter.            Passed - PHQ-9 score less than 5 in past 6 months     Please review last PHQ-9 score.   PHQ-9 SCORE 3/26/2019 8/10/2019 10/4/2019   PHQ-9 Total Score - - -   PHQ-9 Total Score MyChart - 5 (Mild depression) -   PHQ-9 Total Score 7 5 3       ANUSHA-7 SCORE 12/18/2018 3/26/2019 10/4/2019   Total Score - - -   Total Score 2 1 5                 Passed - Medication is active on med list        Passed - Patient is age 18 or older        Passed - No active pregnancy on record        Passed - No positive pregnancy test in last 12 months          "

## 2020-03-16 ENCOUNTER — ALLIED HEALTH/NURSE VISIT (OUTPATIENT)
Dept: NURSING | Facility: CLINIC | Age: 65
End: 2020-03-16
Payer: COMMERCIAL

## 2020-03-16 DIAGNOSIS — E78.00 ELEVATED LDL CHOLESTEROL LEVEL: ICD-10-CM

## 2020-03-16 DIAGNOSIS — E03.9 HYPOTHYROIDISM: Primary | ICD-10-CM

## 2020-03-16 DIAGNOSIS — E03.9 HYPOTHYROIDISM: ICD-10-CM

## 2020-03-16 LAB
ALBUMIN SERPL-MCNC: 3.8 G/DL (ref 3.4–5)
ALP SERPL-CCNC: 145 U/L (ref 40–150)
ALT SERPL W P-5'-P-CCNC: 25 U/L (ref 0–50)
ANION GAP SERPL CALCULATED.3IONS-SCNC: 3 MMOL/L (ref 3–14)
AST SERPL W P-5'-P-CCNC: 22 U/L (ref 0–45)
BILIRUB SERPL-MCNC: 0.4 MG/DL (ref 0.2–1.3)
BUN SERPL-MCNC: 31 MG/DL (ref 7–30)
CALCIUM SERPL-MCNC: 9 MG/DL (ref 8.5–10.1)
CHLORIDE SERPL-SCNC: 110 MMOL/L (ref 94–109)
CO2 SERPL-SCNC: 25 MMOL/L (ref 20–32)
CREAT SERPL-MCNC: 0.84 MG/DL (ref 0.52–1.04)
GFR SERPL CREATININE-BSD FRML MDRD: 73 ML/MIN/{1.73_M2}
GLUCOSE SERPL-MCNC: 74 MG/DL (ref 70–99)
LDLC SERPL DIRECT ASSAY-MCNC: 156 MG/DL
POTASSIUM SERPL-SCNC: 4.7 MMOL/L (ref 3.4–5.3)
PROT SERPL-MCNC: 7.5 G/DL (ref 6.8–8.8)
SODIUM SERPL-SCNC: 138 MMOL/L (ref 133–144)
TSH SERPL DL<=0.005 MIU/L-ACNC: 1.71 MU/L (ref 0.4–4)

## 2020-03-16 PROCEDURE — 80053 COMPREHEN METABOLIC PANEL: CPT | Performed by: FAMILY MEDICINE

## 2020-03-16 PROCEDURE — 84443 ASSAY THYROID STIM HORMONE: CPT | Performed by: FAMILY MEDICINE

## 2020-03-16 PROCEDURE — 83721 ASSAY OF BLOOD LIPOPROTEIN: CPT | Performed by: FAMILY MEDICINE

## 2020-03-16 PROCEDURE — 36415 COLL VENOUS BLD VENIPUNCTURE: CPT | Performed by: FAMILY MEDICINE

## 2020-03-16 PROCEDURE — 99207 ZZC NO CHARGE NURSE ONLY: CPT

## 2020-03-16 NOTE — NURSING NOTE
Patient presents to clinic to request some refills for her medications. She was swearing at the  staff and also at me. She is wanting a years worth of her medications sent to her mail order. She was informed that she is due to be seen in clinic as it has been since March of 2019. She was scheduled for April but we needed to reschedule her for July due to the recent Corona virus outbreak. We did have her get her labs drawn today and advised she needs an evisit or telephone visit per Dr. Oswald before she will be getting a years worth of medication. She was very upset about this. She did agree to finally do a telephone visit which is scheduled for tomorrow afternoon.     Ally Perez RN

## 2020-03-16 NOTE — LETTER
March 17, 2020      Enid Dost  [address] [place] [address]          Rachell,       Your thyroid testing was normal.  Electrolytes, kidney and liver testing were fine as well.  Your cholesterol remains elevated, at about the same level it has been in the past.     This level of cholesterol does put you at increased risk for heart disease.  We should discuss this in your telephone visit as well.       The 10-year ASCVD risk score (Sreedhar HILL Jr., et al., 2013) is: 6.5%     Values used to calculate the score:       Age: 64 years       Sex: Female       Is Non- : No       Diabetic: No       Tobacco smoker: No       Systolic Blood Pressure: 139 mmHg       Is BP treated: No       HDL Cholesterol: 81 mg/dL       Total Cholesterol: 269 mg/dL     Resulted Orders   **Comprehensive metabolic panel FUTURE 1yr   Result Value Ref Range    Sodium 138 133 - 144 mmol/L    Potassium 4.7 3.4 - 5.3 mmol/L    Chloride 110 (H) 94 - 109 mmol/L    Carbon Dioxide 25 20 - 32 mmol/L    Anion Gap 3 3 - 14 mmol/L    Glucose 74 70 - 99 mg/dL      Comment:      Non Fasting    Urea Nitrogen 31 (H) 7 - 30 mg/dL    Creatinine 0.84 0.52 - 1.04 mg/dL    GFR Estimate 73 >60 mL/min/[1.73_m2]      Comment:      Non  GFR Calc  Starting 12/18/2018, serum creatinine based estimated GFR (eGFR) will be   calculated using the Chronic Kidney Disease Epidemiology Collaboration   (CKD-EPI) equation.      GFR Estimate If Black 84 >60 mL/min/[1.73_m2]      Comment:       GFR Calc  Starting 12/18/2018, serum creatinine based estimated GFR (eGFR) will be   calculated using the Chronic Kidney Disease Epidemiology Collaboration   (CKD-EPI) equation.      Calcium 9.0 8.5 - 10.1 mg/dL    Bilirubin Total 0.4 0.2 - 1.3 mg/dL    Albumin 3.8 3.4 - 5.0 g/dL    Protein Total 7.5 6.8 - 8.8 g/dL    Alkaline Phosphatase 145 40 - 150 U/L    ALT 25 0 - 50 U/L    AST 22 0 - 45 U/L   LDL cholesterol direct    Result Value Ref Range    LDL Cholesterol Direct 156 (H) <100 mg/dL      Comment:      Above desirable:  100-129 mg/dl  Borderline High:  130-159 mg/dL  High:             160-189 mg/dL  Very high:       >189 mg/dl     TSH with free T4 reflex   Result Value Ref Range    TSH 1.71 0.40 - 4.00 mU/L       If you have any questions or concerns, please call the clinic at the number listed above.       Sincerely,        Nithya Oswald DO/ag

## 2020-03-17 ENCOUNTER — E-VISIT (OUTPATIENT)
Dept: FAMILY MEDICINE | Facility: CLINIC | Age: 65
End: 2020-03-17
Payer: COMMERCIAL

## 2020-03-17 DIAGNOSIS — E03.4 HYPOTHYROIDISM DUE TO ACQUIRED ATROPHY OF THYROID: ICD-10-CM

## 2020-03-17 DIAGNOSIS — F33.1 MAJOR DEPRESSIVE DISORDER, RECURRENT EPISODE, MODERATE (H): ICD-10-CM

## 2020-03-17 PROCEDURE — G2061 QUAL NONMD EST PT 5-10M: HCPCS | Performed by: FAMILY MEDICINE

## 2020-03-17 ASSESSMENT — PATIENT HEALTH QUESTIONNAIRE - PHQ9
SUM OF ALL RESPONSES TO PHQ QUESTIONS 1-9: 2
SUM OF ALL RESPONSES TO PHQ QUESTIONS 1-9: 2
10. IF YOU CHECKED OFF ANY PROBLEMS, HOW DIFFICULT HAVE THESE PROBLEMS MADE IT FOR YOU TO DO YOUR WORK, TAKE CARE OF THINGS AT HOME, OR GET ALONG WITH OTHER PEOPLE: NOT DIFFICULT AT ALL

## 2020-03-17 ASSESSMENT — ANXIETY QUESTIONNAIRES
1. FEELING NERVOUS, ANXIOUS, OR ON EDGE: SEVERAL DAYS
GAD7 TOTAL SCORE: 3
3. WORRYING TOO MUCH ABOUT DIFFERENT THINGS: NOT AT ALL
GAD7 TOTAL SCORE: 3
7. FEELING AFRAID AS IF SOMETHING AWFUL MIGHT HAPPEN: NOT AT ALL
5. BEING SO RESTLESS THAT IT IS HARD TO SIT STILL: NOT AT ALL
4. TROUBLE RELAXING: SEVERAL DAYS
6. BECOMING EASILY ANNOYED OR IRRITABLE: SEVERAL DAYS
GAD7 TOTAL SCORE: 3
7. FEELING AFRAID AS IF SOMETHING AWFUL MIGHT HAPPEN: NOT AT ALL
2. NOT BEING ABLE TO STOP OR CONTROL WORRYING: NOT AT ALL

## 2020-03-18 ENCOUNTER — OFFICE VISIT (OUTPATIENT)
Dept: FAMILY MEDICINE | Facility: CLINIC | Age: 65
End: 2020-03-18
Payer: COMMERCIAL

## 2020-03-18 ENCOUNTER — ANCILLARY PROCEDURE (OUTPATIENT)
Dept: GENERAL RADIOLOGY | Facility: CLINIC | Age: 65
End: 2020-03-18
Attending: FAMILY MEDICINE
Payer: COMMERCIAL

## 2020-03-18 VITALS
RESPIRATION RATE: 14 BRPM | WEIGHT: 194.38 LBS | BODY MASS INDEX: 31.24 KG/M2 | DIASTOLIC BLOOD PRESSURE: 86 MMHG | HEIGHT: 66 IN | SYSTOLIC BLOOD PRESSURE: 128 MMHG | HEART RATE: 70 BPM | TEMPERATURE: 97.1 F

## 2020-03-18 DIAGNOSIS — R20.2 NUMBNESS AND TINGLING IN LEFT HAND: ICD-10-CM

## 2020-03-18 DIAGNOSIS — M72.2 PLANTAR FASCIITIS OF RIGHT FOOT: ICD-10-CM

## 2020-03-18 DIAGNOSIS — R20.0 NUMBNESS AND TINGLING IN LEFT HAND: Primary | ICD-10-CM

## 2020-03-18 DIAGNOSIS — R20.0 NUMBNESS AND TINGLING IN LEFT HAND: ICD-10-CM

## 2020-03-18 DIAGNOSIS — R20.2 NUMBNESS AND TINGLING IN LEFT HAND: Primary | ICD-10-CM

## 2020-03-18 PROCEDURE — 99213 OFFICE O/P EST LOW 20 MIN: CPT | Performed by: FAMILY MEDICINE

## 2020-03-18 PROCEDURE — 73110 X-RAY EXAM OF WRIST: CPT | Mod: LT

## 2020-03-18 RX ORDER — ESCITALOPRAM OXALATE 20 MG/1
20 TABLET ORAL DAILY
Qty: 90 TABLET | Refills: 3 | Status: SHIPPED | OUTPATIENT
Start: 2020-03-18 | End: 2020-12-30

## 2020-03-18 RX ORDER — LEVOTHYROXINE SODIUM 88 UG/1
88 TABLET ORAL DAILY
Qty: 90 TABLET | Refills: 3 | Status: SHIPPED | OUTPATIENT
Start: 2020-03-18 | End: 2020-12-30

## 2020-03-18 ASSESSMENT — PATIENT HEALTH QUESTIONNAIRE - PHQ9: SUM OF ALL RESPONSES TO PHQ QUESTIONS 1-9: 2

## 2020-03-18 ASSESSMENT — ANXIETY QUESTIONNAIRES: GAD7 TOTAL SCORE: 3

## 2020-03-18 ASSESSMENT — PAIN SCALES - GENERAL: PAINLEVEL: NO PAIN (0)

## 2020-03-18 ASSESSMENT — MIFFLIN-ST. JEOR: SCORE: 1437.08

## 2020-03-18 NOTE — PATIENT INSTRUCTIONS
*    The xray is okay.     *    Try wrapping it or sleeping with a splint.     *    If not better in 3 months, let us know. Next step hand therapy.     *    This seems like carpel tunnel. Check the Internet.     *    For the foot, sounds like plantar fascitis. Again, check for exercises.

## 2020-03-18 NOTE — PROGRESS NOTES
"Subjective     Enid Sexton is a 64 year old female who presents to clinic today for the following health issues:    HPI     - Dull aching feeling in left hand x3 months. She notes that pain started after kicking a clump of snow when out walking down, the snow was stuck to the ground with ice and she fell forward landing on hands. She notes that aching feeling worsens if holding on to an item and that she has dropped things due to weak/numb feeling. She does have tingling and numbness in fingers. No bruising or swelling in hand.    - right foot pain in the mornings, this resolved after she is up moving. NO injury      Reviewed and updated as needed this visit by Provider         Review of Systems   See above.      Objective    /86   Pulse 70   Temp 97.1  F (36.2  C) (Tympanic)   Resp 14   Ht 1.666 m (5' 5.6\")   Wt 88.2 kg (194 lb 6 oz)   BMI 31.76 kg/m    Body mass index is 31.76 kg/m .  Physical Exam   O:  gen: in NAD  Neck: supple, no point vertebral tenderness  Resp: clear, no wheezes, rales, or rhonchi.  CV:  RRR without murmur  MS: No atrophy noted in the upper extremities.  Range of motion intact at the wrists.  Mild tenderness to palpation mid dorsal wrist joint without deformity.  Questionable Tinel's, negative Phalen's sign.    X-ray of the left wrist is unremarkable for acute bony abnormality.        Assessment & Plan     (R20.0,  R20.2) Numbness and tingling in left hand  (primary encounter diagnosis)  Comment: Seems consistent with carpal tunnel with irritation to the median and radial nerves.  Plan: XR Wrist Left G/E 3 Views        Advised using a wrist splint at night, gentle range of motion exercises.  If persists, advised to contact her clinic will refer to hand therapy.    (M72.2) Plantar fasciitis of right foot  Comment: Reviewed exercises.  Symptoms mild.  Plan: Monitor.  If worse would consider physical therapy referral.     BMI:   Estimated body mass index is 31.76 kg/m  as calculated " "from the following:    Height as of this encounter: 1.666 m (5' 5.6\").    Weight as of this encounter: 88.2 kg (194 lb 6 oz).         Patient Instructions   *    The xray is okay.     *    Try wrapping it or sleeping with a splint.     *    If not better in 3 months, let us know. Next step hand therapy.     *    This seems like carpel tunnel. Check the Internet.     *    For the foot, sounds like plantar fascitis. Again, check for exercises.          Return in about 3 months (around 6/18/2020) for Routine Visit.    Nuria Mckinley MD  [facility]        "

## 2020-07-13 ENCOUNTER — OFFICE VISIT (OUTPATIENT)
Dept: FAMILY MEDICINE | Facility: CLINIC | Age: 65
End: 2020-07-13
Payer: COMMERCIAL

## 2020-07-13 VITALS
BODY MASS INDEX: 31.43 KG/M2 | DIASTOLIC BLOOD PRESSURE: 72 MMHG | HEART RATE: 58 BPM | WEIGHT: 195.6 LBS | HEIGHT: 66 IN | SYSTOLIC BLOOD PRESSURE: 128 MMHG | TEMPERATURE: 97.9 F

## 2020-07-13 DIAGNOSIS — Z00.00 ENCOUNTER FOR MEDICARE ANNUAL WELLNESS EXAM: Primary | ICD-10-CM

## 2020-07-13 DIAGNOSIS — Z12.11 SPECIAL SCREENING FOR MALIGNANT NEOPLASMS, COLON: ICD-10-CM

## 2020-07-13 PROCEDURE — G0009 ADMIN PNEUMOCOCCAL VACCINE: HCPCS | Performed by: FAMILY MEDICINE

## 2020-07-13 PROCEDURE — G0402 INITIAL PREVENTIVE EXAM: HCPCS | Performed by: FAMILY MEDICINE

## 2020-07-13 PROCEDURE — 90670 PCV13 VACCINE IM: CPT | Performed by: FAMILY MEDICINE

## 2020-07-13 ASSESSMENT — MIFFLIN-ST. JEOR: SCORE: 1441.05

## 2020-07-13 ASSESSMENT — ACTIVITIES OF DAILY LIVING (ADL): CURRENT_FUNCTION: NO ASSISTANCE NEEDED

## 2020-07-13 NOTE — PROGRESS NOTES
"SUBJECTIVE:   Enid Sexton is a 64 year old female who presents for Preventive Visit.    Are you in the first 12 months of your Medicare coverage?  Yes,  Visual Acuity:  Right Eye: 10/10   Left Eye: 10/25  Both Eyes: 10/12.5    Healthy Habits:    In general, how would you rate your overall health?  Very good    Frequency of exercise:  6-7 days/week    Duration of exercise:  45-60 minutes    Do you usually eat at least 4 servings of fruit and vegetables a day, include whole grains    & fiber and avoid regularly eating high fat or \"junk\" foods?  No    Taking medications regularly:  Yes    Barriers to taking medications:  None    Medication side effects:  None    Ability to successfully perform activities of daily living:  No assistance needed    Home Safety:  No safety concerns identified    Hearing Impairment:  No hearing concerns    In the past 6 months, have you been bothered by leaking of urine?  No    In general, how would you rate your overall mental or emotional health?  Very good      PHQ-2 Total Score:    Additional concerns today:  Yes    Concerns:  * tingling of fingers on left hand.  Occurred after a fall over the winter.  Was much worse in March and seems to be improved now.  No longer taking any medication and is not keeping her up at night.  Feels it is improving and not interested in further eval at this time    Do you feel safe in your environment? Yes    Have you ever done Advance Care Planning? (For example, a Health Directive, POLST, or a discussion with a medical provider or your loved ones about your wishes): Yes, patient states has an Advance Care Planning document and will bring a copy to the clinic.      Fall risk  Fallen 2 or more times in the past year?: No  Any fall with injury in the past year?: Yes  Timed Up and Go Test (>13.5 is fall risk; contact physician) : 5    Cognitive Screening   1) Repeat 3 items (Leader, Season, Table)    2) Clock draw: NORMAL  3) 3 item recall: Recalls 1 object "   Results: NORMAL clock, 1-2 items recalled: COGNITIVE IMPAIRMENT LESS LIKELY    Mini-CogTM Copyright BRETT Valencia. Licensed by the author for use in [facility]; reprinted with permission (dion@[facility]). All rights reserved.      Do you have sleep apnea, excessive snoring or daytime drowsiness?: yes    Reviewed and updated as needed this visit by clinical staff  Tobacco  Allergies  Meds  Med Hx  Surg Hx  Fam Hx  Soc Hx        Reviewed and updated as needed this visit by Provider  Tobacco  Allergies  Meds  Med Hx  Surg Hx  Fam Hx  Soc Hx       Social History     Tobacco Use     Smoking status: Former Smoker     Packs/day: 1.00     Years: 30.00     Pack years: 30.00     Types: Cigarettes     Start date: 1970     Last attempt to quit: 2008     Years since quittin.8     Smokeless tobacco: Never Used   Substance Use Topics     Alcohol use: Yes     Comment: Occasional beer     If you drink alcohol do you typically have >3 drinks per day or >7 drinks per week? No    Alcohol Use 2020   Prescreen: >3 drinks/day or >7 drinks/week? -   Prescreen: >3 drinks/day or >7 drinks/week? No       Current providers sharing in care for this patient include:   Patient Care Team:  Nithya Oswald DO as PCP - General (Family Practice)  Nithya Oswald DO as Assigned PCP    The following health maintenance items are reviewed in Epic and correct as of today:  Health Maintenance   Topic Date Due     COLORECTAL CANCER SCREENING  2019     DEXA  2021 (Originally 1955)     INFLUENZA VACCINE (1) 2020     PHQ-9  2020     MAMMO SCREENING  01/10/2021     MEDICARE ANNUAL WELLNESS VISIT  2021     FALL RISK ASSESSMENT  2021     PNEUMOCOCCAL IMMUNIZATION 65+ LOW/MEDIUM RISK (2 of 2 - PPSV23) 2021     LIPID  2023     ADVANCE CARE PLANNING  2025     DTAP/TDAP/TD IMMUNIZATION (4 - Td) 10/23/2028     HEPATITIS C SCREENING  Completed     DEPRESSION ACTION  "PLAN  Completed     ZOSTER IMMUNIZATION  Completed     HIV SCREENING  Addressed     IPV IMMUNIZATION  Aged Out     MENINGITIS IMMUNIZATION  Aged Out     HEPATITIS B IMMUNIZATION  Aged Out         Review of Systems  Constitutional, HEENT, cardiovascular, pulmonary, gi and gu systems are negative, except as otherwise noted.    OBJECTIVE:   /72   Pulse 58   Temp 97.9  F (36.6  C) (Tympanic)   Ht 1.664 m (5' 5.5\")   Wt 88.7 kg (195 lb 9.6 oz)   Breastfeeding No   BMI 32.05 kg/m   Estimated body mass index is 32.05 kg/m  as calculated from the following:    Height as of this encounter: 1.664 m (5' 5.5\").    Weight as of this encounter: 88.7 kg (195 lb 9.6 oz).  Physical Exam  GENERAL APPEARANCE: healthy, alert and no distress  EYES: Eyes grossly normal to inspection, PERRL and conjunctivae and sclerae normal  HENT: ear canals and TM's normal, nose and mouth without ulcers or lesions, oropharynx clear and oral mucous membranes moist  NECK: no adenopathy, no asymmetry, masses, or scars and thyroid normal to palpation  RESP: lungs clear to auscultation - no rales, rhonchi or wheezes  CV: regular rate and rhythm, normal S1 S2, no S3 or S4, no murmur, click or rub, no peripheral edema and peripheral pulses strong  ABDOMEN: soft, nontender, no hepatosplenomegaly, no masses and bowel sounds normal  MS: no musculoskeletal defects are noted and gait is age appropriate without ataxia  NEURO: Normal strength and tone, sensory exam grossly normal, mentation intact and speech normal  PSYCH: mentation appears normal and affect normal/bright    Diagnostic Test Results:  Labs reviewed in Epic    ASSESSMENT / PLAN:       ICD-10-CM    1. Encounter for Medicare annual wellness exam  Z00.00    2. Special screening for malignant neoplasms, colon  Z12.11 Fecal colorectal cancer screen (FIT)     Reviewed colon screening.  Pt with positive FIT test in 2017.  Never scheduled colonoscopy and then had repeat FIT times 2 which were " "negative.  Reviewed that recommendation would be for colonoscopy given the previous positive FIT.  If colonoscopy negative the could resume FIT testing at next needed interval.  Pt is adamant that she will not do a colonoscopy now.  Reviewed limitation of FIT testing and that negative resutls do not necessarily mean a normal colon.  She verbalizes understanding and declines scope.  New FIT ordered.      COUNSELING:  Reviewed preventive health counseling, as reflected in patient instructions  Special attention given to:       Regular exercise       Healthy diet/nutrition       Fall risk prevention       Immunizations    Vaccinated for: Pneumococcal             Osteoporosis Prevention/Bone Health       Colon cancer screening       Advanced Planning     Estimated body mass index is 32.05 kg/m  as calculated from the following:    Height as of this encounter: 1.664 m (5' 5.5\").    Weight as of this encounter: 88.7 kg (195 lb 9.6 oz).    Weight management plan: Discussed healthy diet and exercise guidelines     reports that she quit smoking about 11 years ago. Her smoking use included cigarettes. She started smoking about 50 years ago. She has a 30.00 pack-year smoking history. She has never used smokeless tobacco.      Appropriate preventive services were discussed with this patient, including applicable screening as appropriate for cardiovascular disease, diabetes, osteopenia/osteoporosis, and glaucoma.  As appropriate for age/gender, discussed screening for colorectal cancer, prostate cancer, breast cancer, and cervical cancer. Checklist reviewing preventive services available has been given to the patient.    Reviewed patients plan of care and provided an AVS. The Basic Care Plan (routine screening as documented in Health Maintenance) for Enid meets the Care Plan requirement. This Care Plan has been established and reviewed with the Patient.    Counseling Resources:  ATP IV Guidelines  Pooled Cohorts Equation " Calculator  Breast Cancer Risk Calculator  FRAX Risk Assessment  ICSI Preventive Guidelines  Dietary Guidelines for Americans, 2010  [employer]'s MyPlate  ASA Prophylaxis  Lung CA Screening    Nithya Oswald,   [place] [facility]    Identified Health Risks:

## 2020-07-13 NOTE — NURSING NOTE
"Initial /72   Pulse 58   Temp 97.9  F (36.6  C) (Tympanic)   Ht 1.664 m (5' 5.5\")   Wt 88.7 kg (195 lb 9.6 oz)   Breastfeeding No   BMI 32.05 kg/m   Estimated body mass index is 32.05 kg/m  as calculated from the following:    Height as of this encounter: 1.664 m (5' 5.5\").    Weight as of this encounter: 88.7 kg (195 lb 9.6 oz). .      "

## 2020-07-13 NOTE — PATIENT INSTRUCTIONS
Preventive Health Recommendations    See your health care provider every year to    Review health changes.     Discuss preventive care.      Review your medicines if your doctor has prescribed any.    You no longer need a yearly Pap test unless you've had an abnormal Pap test in the past 10 years. If you have vaginal symptoms, such as bleeding or discharge, be sure to talk with your provider about a Pap test.    Every 1 to 2 years, have a mammogram.  If you are over 69, talk with your health care provider about whether or not you want to continue having screening mammograms.    Every 10 years, have a colonoscopy. Or, have a yearly FIT test (stool test). These exams will check for colon cancer.     Have a cholesterol test every 5 years, or more often if your doctor advises it.     Have a diabetes test (fasting glucose) every three years. If you are at risk for diabetes, you should have this test more often.     At age 65, have a bone density scan (DEXA) to check for osteoporosis (brittle bone disease).    Shots:    Get a flu shot each year.    Get a tetanus shot every 10 years.    Talk to your doctor about your pneumonia vaccines. There are now two you should receive - Pneumovax (PPSV 23) and Prevnar (PCV 13).    Talk to your pharmacist about the shingles vaccine.    Talk to your doctor about the hepatitis B vaccine.    Nutrition:     Eat at least 5 servings of fruits and vegetables each day.    Eat whole-grain bread, whole-wheat pasta and brown rice instead of white grains and rice.    Get adequate Calcium and Vitamin D.     Lifestyle    Exercise at least 150 minutes a week (30 minutes a day, 5 days a week). This will help you control your weight and prevent disease.    Limit alcohol to one drink per day.    No smoking.     Wear sunscreen to prevent skin cancer.     See your dentist twice a year for an exam and cleaning.    See your eye doctor every 1 to 2 years to screen for conditions such as glaucoma, macular  degeneration and cataracts.

## 2020-07-14 DIAGNOSIS — Z12.11 SPECIAL SCREENING FOR MALIGNANT NEOPLASMS, COLON: ICD-10-CM

## 2020-07-14 PROCEDURE — 82274 ASSAY TEST FOR BLOOD FECAL: CPT | Performed by: FAMILY MEDICINE

## 2020-07-17 LAB — HEMOCCULT STL QL IA: POSITIVE

## 2020-07-20 ENCOUNTER — TELEPHONE (OUTPATIENT)
Dept: FAMILY MEDICINE | Facility: CLINIC | Age: 65
End: 2020-07-20

## 2020-07-20 NOTE — TELEPHONE ENCOUNTER
Called and spoke with pt.  Reviewed positive FIT test.  We had discussed in clinic as well as pt has had a previous positive.  She declined colonoscopy at that time.    Today states she will consider colonoscopy given the second positive test.  Reviewed locations where testing can be done and the general process.  She will consider.    Please put a reminder in pt's chart to call her again in 2 weeks to encouraged colonoscopy.    Nithya Oswald, DO

## 2020-08-06 ENCOUNTER — TELEPHONE (OUTPATIENT)
Dept: FAMILY MEDICINE | Facility: CLINIC | Age: 65
End: 2020-08-06

## 2020-08-06 NOTE — TELEPHONE ENCOUNTER
"Spoke with patient, reminded her to schedule colonoscopy.  She said \"I'm still thinking about it.\" Advised this is important for her to do and told her I will send a [employer] for [employer] message to her as well with the scheduling number.  Jazzmine Jones, [facility]    "

## 2020-08-18 ENCOUNTER — TELEPHONE (OUTPATIENT)
Dept: FAMILY MEDICINE | Facility: CLINIC | Age: 65
End: 2020-08-18

## 2020-09-04 ENCOUNTER — TELEPHONE (OUTPATIENT)
Dept: FAMILY MEDICINE | Facility: CLINIC | Age: 65
End: 2020-09-04

## 2020-09-04 NOTE — TELEPHONE ENCOUNTER
Called patient about colonoscopy.  She is aware that it is recommended but she does not want to do it.  I stressed that it is important because of the positive FIT test but patient has declined.  She states she is not too worried about it.    Jazzmine Jones, CMA

## 2020-12-27 DIAGNOSIS — E03.4 HYPOTHYROIDISM DUE TO ACQUIRED ATROPHY OF THYROID: ICD-10-CM

## 2020-12-27 DIAGNOSIS — F33.1 MAJOR DEPRESSIVE DISORDER, RECURRENT EPISODE, MODERATE (H): ICD-10-CM

## 2020-12-28 NOTE — TELEPHONE ENCOUNTER
"Requested Prescriptions   Pending Prescriptions Disp Refills     levothyroxine (SYNTHROID/LEVOTHROID) 88 MCG tablet [Pharmacy Med Name: LEVOTHYROXINE  88MCG  TAB] 90 tablet 3     Sig: TAKE 1 TABLET BY MOUTH  DAILY       Thyroid Protocol Passed - 12/27/2020 10:00 PM        Passed - Patient is 12 years or older        Passed - Recent (12 mo) or future (30 days) visit within the authorizing provider's specialty     Patient has had an office visit with the authorizing provider or a provider within the authorizing providers department within the previous 12 mos or has a future within next 30 days. See \"Patient Info\" tab in inbasket, or \"Choose Columns\" in Meds & Orders section of the refill encounter.              Passed - Medication is active on med list        Passed - Normal TSH on file in past 12 months     Recent Labs   Lab Test 03/16/20  0950   TSH 1.71              Passed - No active pregnancy on record     If patient is pregnant or has had a positive pregnancy test, please check TSH.          Passed - No positive pregnancy test in past 12 months     If patient is pregnant or has had a positive pregnancy test, please check TSH.             escitalopram (LEXAPRO) 20 MG tablet [Pharmacy Med Name: ESCITALOPRAM  20MG  TAB] 90 tablet 3     Sig: TAKE 1 TABLET BY MOUTH  DAILY       SSRIs Protocol Failed - 12/27/2020 10:00 PM        Failed - PHQ-9 score less than 5 in past 6 months     Please review last PHQ-9 score.           Passed - Medication is active on med list        Passed - Patient is age 18 or older        Passed - No active pregnancy on record        Passed - No positive pregnancy test in last 12 months        Passed - Recent (6 mo) or future (30 days) visit within the authorizing provider's specialty     Patient had office visit in the last 6 months or has a visit in the next 30 days with authorizing provider or within the authorizing provider's specialty.  See \"Patient Info\" tab in inbasket, or \"Choose " "Columns\" in Meds & Orders section of the refill encounter.                 "

## 2020-12-30 RX ORDER — ESCITALOPRAM OXALATE 20 MG/1
TABLET ORAL
Qty: 90 TABLET | Refills: 3 | Status: SHIPPED | OUTPATIENT
Start: 2020-12-30 | End: 2021-07-12 | Stop reason: ALTCHOICE

## 2020-12-30 RX ORDER — LEVOTHYROXINE SODIUM 88 UG/1
TABLET ORAL
Qty: 90 TABLET | Refills: 1 | Status: SHIPPED | OUTPATIENT
Start: 2020-12-30 | End: 2021-06-08

## 2021-01-02 ENCOUNTER — MYC MEDICAL ADVICE (OUTPATIENT)
Dept: FAMILY MEDICINE | Facility: CLINIC | Age: 66
End: 2021-01-02

## 2021-01-04 ENCOUNTER — MYC MEDICAL ADVICE (OUTPATIENT)
Dept: FAMILY MEDICINE | Facility: CLINIC | Age: 66
End: 2021-01-04

## 2021-03-02 ENCOUNTER — MYC MEDICAL ADVICE (OUTPATIENT)
Dept: FAMILY MEDICINE | Facility: CLINIC | Age: 66
End: 2021-03-02

## 2021-03-02 DIAGNOSIS — E03.9 HYPOTHYROIDISM: Primary | ICD-10-CM

## 2021-03-06 ENCOUNTER — IMMUNIZATION (OUTPATIENT)
Dept: FAMILY MEDICINE | Facility: CLINIC | Age: 66
End: 2021-03-06
Payer: COMMERCIAL

## 2021-03-06 PROCEDURE — 0001A PR COVID VAC PFIZER DIL RECON 30 MCG/0.3 ML IM: CPT

## 2021-03-06 PROCEDURE — 91300 PR COVID VAC PFIZER DIL RECON 30 MCG/0.3 ML IM: CPT

## 2021-03-17 DIAGNOSIS — E03.9 HYPOTHYROIDISM: ICD-10-CM

## 2021-03-17 LAB
ANION GAP SERPL CALCULATED.3IONS-SCNC: 7 MMOL/L (ref 3–14)
BUN SERPL-MCNC: 29 MG/DL (ref 7–30)
CALCIUM SERPL-MCNC: 9.2 MG/DL (ref 8.5–10.1)
CHLORIDE SERPL-SCNC: 107 MMOL/L (ref 94–109)
CO2 SERPL-SCNC: 26 MMOL/L (ref 20–32)
CREAT SERPL-MCNC: 1.07 MG/DL (ref 0.52–1.04)
GFR SERPL CREATININE-BSD FRML MDRD: 54 ML/MIN/{1.73_M2}
GLUCOSE SERPL-MCNC: 111 MG/DL (ref 70–99)
POTASSIUM SERPL-SCNC: 4.7 MMOL/L (ref 3.4–5.3)
SODIUM SERPL-SCNC: 140 MMOL/L (ref 133–144)
TSH SERPL DL<=0.005 MIU/L-ACNC: 0.87 MU/L (ref 0.4–4)

## 2021-03-17 PROCEDURE — 36415 COLL VENOUS BLD VENIPUNCTURE: CPT | Performed by: FAMILY MEDICINE

## 2021-03-17 PROCEDURE — 84443 ASSAY THYROID STIM HORMONE: CPT | Performed by: FAMILY MEDICINE

## 2021-03-17 PROCEDURE — 80048 BASIC METABOLIC PNL TOTAL CA: CPT | Performed by: FAMILY MEDICINE

## 2021-03-27 ENCOUNTER — IMMUNIZATION (OUTPATIENT)
Dept: FAMILY MEDICINE | Facility: CLINIC | Age: 66
End: 2021-03-27
Attending: FAMILY MEDICINE
Payer: COMMERCIAL

## 2021-03-27 PROCEDURE — 0002A PR COVID VAC PFIZER DIL RECON 30 MCG/0.3 ML IM: CPT

## 2021-03-27 PROCEDURE — 91300 PR COVID VAC PFIZER DIL RECON 30 MCG/0.3 ML IM: CPT

## 2021-04-17 ENCOUNTER — HEALTH MAINTENANCE LETTER (OUTPATIENT)
Age: 66
End: 2021-04-17

## 2021-06-07 DIAGNOSIS — E03.4 HYPOTHYROIDISM DUE TO ACQUIRED ATROPHY OF THYROID: ICD-10-CM

## 2021-06-08 RX ORDER — LEVOTHYROXINE SODIUM 88 UG/1
TABLET ORAL
Qty: 90 TABLET | Refills: 0 | Status: SHIPPED | OUTPATIENT
Start: 2021-06-08 | End: 2021-09-01

## 2021-06-08 NOTE — TELEPHONE ENCOUNTER
Prescription approved per [facility] Refill Protocol.  Zenaida Olivarez RN

## 2021-06-11 ENCOUNTER — MYC MEDICAL ADVICE (OUTPATIENT)
Dept: FAMILY MEDICINE | Facility: CLINIC | Age: 66
End: 2021-06-11

## 2021-06-11 NOTE — TELEPHONE ENCOUNTER
Call placed to patient  No answer, message left stating that patient would need to schedule a VV or an in person visit with Dr. Oswald to review symptoms and discuss medications    Dr. Oswald sent a [employer] message with the above information as well    Sree Boles RN

## 2021-06-11 NOTE — TELEPHONE ENCOUNTER
"Call placed to patient regarding mychart message    Patient states when she started the Escitalopram - she was doing very well - states she felt like a \"normal\" person    Patient reports a few months ago she started to realize that she was sleeping approx 12 hours a night and taking additional naps during the day  Patient states in the mornings she will lay awake in bed for a long time and have to force herself to get up and go about her day  Patient also reports she has been having thoughts of not really wanting to be alive - patient denies a plan of self harm and feels safe in her home    Patient reports she had increased the Escitalopram to 1.5 tabs daily with very minimal improvement in symptoms  Patient would like Dr. Oswald to review and provide recommendation    Sree Boles RN    "

## 2021-06-14 ENCOUNTER — VIRTUAL VISIT (OUTPATIENT)
Dept: FAMILY MEDICINE | Facility: CLINIC | Age: 66
End: 2021-06-14
Payer: COMMERCIAL

## 2021-06-14 DIAGNOSIS — F33.1 MAJOR DEPRESSIVE DISORDER, RECURRENT EPISODE, MODERATE (H): Primary | ICD-10-CM

## 2021-06-14 PROCEDURE — 99213 OFFICE O/P EST LOW 20 MIN: CPT | Mod: TEL | Performed by: FAMILY MEDICINE

## 2021-06-14 RX ORDER — VENLAFAXINE HYDROCHLORIDE 37.5 MG/1
CAPSULE, EXTENDED RELEASE ORAL
Qty: 60 CAPSULE | Refills: 0 | Status: SHIPPED | OUTPATIENT
Start: 2021-06-14 | End: 2021-08-05 | Stop reason: DRUGHIGH

## 2021-06-14 ASSESSMENT — PATIENT HEALTH QUESTIONNAIRE - PHQ9: SUM OF ALL RESPONSES TO PHQ QUESTIONS 1-9: 17

## 2021-06-14 NOTE — PROGRESS NOTES
Rachell is a 65 year old who is being evaluated via a billable telephone visit.      What phone number would you like to be contacted at? [phone]  How would you like to obtain your AVS? MyChart     1:15 PM      A/P:      ICD-10-CM    1. Major depressive disorder, recurrent episode, moderate (H)  F33.1 venlafaxine (EFFEXOR-XR) 37.5 MG 24 hr capsule     Reviewed options.  Plan to wean lexapro and begin venlafaxine.  F/u 4 weeks, sooner for concerns.    Subjective   Rachell is a 65 year old who presents for the following health issues     HPI     Depression Followup    How are you doing with your depression since your last visit? Worsened for the last 2-3 months    Are you having other symptoms that might be associated with depression? Yes:  sleeping a lot, not wanting to get out of bed, lack of motivation    Have you had a significant life event?  No     Are you feeling anxious or having panic attacks?   No    Do you have any concerns with your use of alcohol or other drugs? No    Social History     Tobacco Use     Smoking status: Former Smoker     Packs/day: 1.00     Years: 30.00     Pack years: 30.00     Types: Cigarettes     Start date: 1970     Quit date: 2008     Years since quittin.8     Smokeless tobacco: Never Used   Substance Use Topics     Alcohol use: Yes     Comment: Occasional beer     Drug use: No     PHQ 10/4/2019 3/17/2020 2021   PHQ-9 Total Score 3 2 17   Q9: Thoughts of better off dead/self-harm past 2 weeks Not at all Not at all Several days     ANUSHA-7 SCORE 3/26/2019 10/4/2019 3/17/2020   Total Score - - -   Total Score - - 3 (minimal anxiety)   Total Score 1 5 3       Suicide Assessment Five-step Evaluation and Treatment (SAFE-T)      How many servings of fruits and vegetables do you eat daily?  0-1    On average, how many sweetened beverages do you drink each day (Examples: soda, juice, sweet tea, etc.  Do NOT count diet or artificially sweetened beverages)?   0-1    How many  "days per week do you exercise enough to make your heart beat faster? 7    How many minutes a day do you exercise enough to make your heart beat faster? 30 - 60    How many days per week do you miss taking your medication? 0    Feels mood has been worse the last few months.  Felt the lexapro was working great, did not even think about it, \"felt normal\".  Feels like \"all of a sudden\" she does not want to get out of bed or do anything.  Having thoughts about not wanting to do anything, feeling bad about herself.    Denies any active suicidal ideation.  Not thoughts of self harm, more about not caring if she was no longer around.   Feels she is safe     About 1 month ago increased her dose to 30mg but did not feel it made any difference at all.    Has been on sertraline, fluoxetine, citalopram, and wellbutrin.  Feels the wellbutrin might have worked but did not like having to take 2 pills.      Review of Systems   Constitutional, HEENT, cardiovascular, pulmonary, gi and gu systems are negative, except as otherwise noted.      Objective           Vitals:  No vitals were obtained today due to virtual visit.    Physical Exam   healthy, alert and no distress  PSYCH: Alert and oriented times 3; coherent speech, normal   rate and volume, able to articulate logical thoughts, able   to abstract reason, no tangential thoughts, no hallucinations   or delusions  Her affect is normal  RESP: No cough, no audible wheezing, able to talk in full sentences  Remainder of exam unable to be completed due to telephone visits    Epic reviewed    1:34 PM          Phone call duration: 19 minutes    "

## 2021-06-14 NOTE — PATIENT INSTRUCTIONS
Please begin 10mg of lexapro for the next 2 days then stop.  You can then begin the venlafaxine 1 capsule daily for 4 days then 2 capsules daily.  Please let me know if you have any difficulty with this medication.    We should follow up in 4 weeks to see how things are working.

## 2021-07-12 ENCOUNTER — VIRTUAL VISIT (OUTPATIENT)
Dept: FAMILY MEDICINE | Facility: CLINIC | Age: 66
End: 2021-07-12
Payer: COMMERCIAL

## 2021-07-12 DIAGNOSIS — F33.1 MAJOR DEPRESSIVE DISORDER, RECURRENT EPISODE, MODERATE (H): Primary | ICD-10-CM

## 2021-07-12 PROCEDURE — 99213 OFFICE O/P EST LOW 20 MIN: CPT | Mod: TEL | Performed by: FAMILY MEDICINE

## 2021-07-12 RX ORDER — VENLAFAXINE HYDROCHLORIDE 150 MG/1
150 CAPSULE, EXTENDED RELEASE ORAL DAILY
Qty: 30 CAPSULE | Refills: 0 | Status: SHIPPED | OUTPATIENT
Start: 2021-07-12 | End: 2021-08-05 | Stop reason: DRUGHIGH

## 2021-07-12 ASSESSMENT — PATIENT HEALTH QUESTIONNAIRE - PHQ9: SUM OF ALL RESPONSES TO PHQ QUESTIONS 1-9: 16

## 2021-07-12 NOTE — PROGRESS NOTES
"Rachell is a 65 year old who is being evaluated via a billable telephone visit.      What phone number would you like to be contacted at? [phone]  How would you like to obtain your AVS?      2:49 PM      A/P:      ICD-10-CM    1. Major depressive disorder, recurrent episode, moderate (H)  F33.1 venlafaxine (EFFEXOR-XR) 150 MG 24 hr capsule     Has not had any problems with the medication but has not really noted any improvement.  Willing to consider a higher dose.  Increase to 150mg daily and f/u 1 month    Subjective   Rachell is a 65 year old who presents for the following health issues     HPI     Depression Followup    How are you doing with your depression since your last visit? Improved slightly    Are you having other symptoms that might be associated with depression? Yes:  sleeping a lot, no motivation to get out of bed, \"feels blah\"    Have you had a significant life event?  No     Are you feeling anxious or having panic attacks?   No    Do you have any concerns with your use of alcohol or other drugs? No    Not really sure she has noticed anything from the medicine change.  Not really feeling any different.    Still has passive thoughts of not being around but no active suicidal ideation.  Has pets to care for and reasons to stick around.    No side effects  Social History     Tobacco Use     Smoking status: Former Smoker     Packs/day: 1.00     Years: 30.00     Pack years: 30.00     Types: Cigarettes     Start date: 1970     Quit date: 2008     Years since quittin.8     Smokeless tobacco: Never Used   Substance Use Topics     Alcohol use: Yes     Comment: Occasional beer     Drug use: No     PHQ 3/17/2020 2021 2021   PHQ-9 Total Score 2 17 16   Q9: Thoughts of better off dead/self-harm past 2 weeks Not at all Several days Several days     ANUSHA-7 SCORE 3/26/2019 10/4/2019 3/17/2020   Total Score - - -   Total Score - - 3 (minimal anxiety)   Total Score 1 5 3         How many " servings of fruits and vegetables do you eat daily?  0-1    On average, how many sweetened beverages do you drink each day (Examples: soda, juice, sweet tea, etc.  Do NOT count diet or artificially sweetened beverages)?   0    How many days per week do you exercise enough to make your heart beat faster? 7    How many minutes a day do you exercise enough to make your heart beat faster? 60 or more    How many days per week do you miss taking your medication? 0        Review of Systems   Constitutional, HEENT, cardiovascular, pulmonary, gi and gu systems are negative, except as otherwise noted.      Objective           Vitals:  No vitals were obtained today due to virtual visit.    Physical Exam   healthy, alert and no distress  PSYCH: Alert and oriented times 3; coherent speech, normal   rate and volume, able to articulate logical thoughts, able   to abstract reason, no tangential thoughts, no hallucinations   or delusions  Her affect is flat  RESP: No cough, no audible wheezing, able to talk in full sentences  Remainder of exam unable to be completed due to telephone visits    Epic reviewed          2:58 PM    Phone call duration: 9 minutes

## 2021-08-02 ENCOUNTER — TELEPHONE (OUTPATIENT)
Dept: FAMILY MEDICINE | Facility: CLINIC | Age: 66
End: 2021-08-02

## 2021-08-02 DIAGNOSIS — F33.1 MAJOR DEPRESSIVE DISORDER, RECURRENT EPISODE, MODERATE (H): Primary | ICD-10-CM

## 2021-08-02 NOTE — TELEPHONE ENCOUNTER
Reason for Call:  Other prescription    Detailed comments: Patient is calling stating that she could not get Mychart to work. But she said that the venlafaxine (EFFEXOR-XR) 150 MG 24 hr capsule is not working. She said she has seen her the last 2 months so she should not have to be seen for this. The patient is kind of getting upset that the medications are not working.     Phone Number Patient can be reached at: Home number on file [phone] (home)    Best Time: any    Can we leave a detailed message on this number? YES    Call taken on 8/2/2021 at 10:14 AM by Gloria Guerrero

## 2021-08-02 NOTE — TELEPHONE ENCOUNTER
"This will be a difficult one to triage given it sounds like Dr. Oswald has been working on patient with this for some time and per one of the previous notes from several visits ago she has tried and not tolerated or not had success with several medications  \"Has been on sertraline, fluoxetine, citalopram, and wellbutrin.  Feels the wellbutrin might have worked but did not like having to take 2 pills.\"     I don't feel this is going to be an easy fix and I understand her frustration which is why it might be best to talk with the provider who has been dealing with this most versus someone brand new walking into this. The only thing I can say is that from the comment above it sounds like she had the best results with wellbutrin so may be worth trying that again, especially if we taper off the effexor.     Dr. Oswald is back in clinic tomorrow so if patient is okay with waiting for her to review this?     Kateryna Mota PA-C    "

## 2021-08-04 NOTE — TELEPHONE ENCOUNTER
Call placed to patient  Relayed Dr. Oswald's message    Patient very adamant that she will not see a psychiatric provider - states there is no reason - she just needs to find the right medication that will work for her.   Patient states she has been dealing with this for years and just needs to find the right medication that will work for her.     Patient states she is okay with increasing the dosage of the medication to 225mg     Will route to Dr. Oswald to review    Sree Boles RN

## 2021-08-04 NOTE — TELEPHONE ENCOUNTER
Please call pt as I was not able to reach out to pt yesterday.  It does take some time for these medicines to work and she has been on this dose of venlafaxine for only 3 weeks.    Options now would be to increase the venlafaxine to 225mg daily or to have her see the collaborative care psychiatric provider to make other recommendations for us.  I am happy to increase the dose and make the referral both which is likely our next best step.      Nithya Oswald, DO

## 2021-08-05 RX ORDER — VENLAFAXINE HYDROCHLORIDE 75 MG/1
225 CAPSULE, EXTENDED RELEASE ORAL DAILY
Qty: 90 CAPSULE | Refills: 0 | Status: SHIPPED | OUTPATIENT
Start: 2021-08-05 | End: 2021-09-05

## 2021-08-05 NOTE — TELEPHONE ENCOUNTER
Please let pt know that I filled the venlafaxine 225mg for her.  The capsule does not come in this dose, she'll need to take 3 of the 75mg capsules to equal the dose.    I also placed the collaborative care psychiatry referral for her.  This is not for therapy, it is for help with medications.  If this dose increase is not effective for her I am going to need help figuring out what will work as she has been on many different medicines in the past.      It can take many weeks to get an appointment and I would encourage her to schedule now.  If this dose change ends up working great we can always cancel the appointment.,    She will get a call to schedule    Nithya Oswald,

## 2021-08-10 ENCOUNTER — TRANSFERRED RECORDS (OUTPATIENT)
Dept: HEALTH INFORMATION MANAGEMENT | Facility: CLINIC | Age: 66
End: 2021-08-10

## 2021-09-02 ENCOUNTER — TELEPHONE (OUTPATIENT)
Dept: FAMILY MEDICINE | Facility: CLINIC | Age: 66
End: 2021-09-02

## 2021-09-02 DIAGNOSIS — F33.1 MAJOR DEPRESSIVE DISORDER, RECURRENT EPISODE, MODERATE (H): ICD-10-CM

## 2021-09-02 RX ORDER — VENLAFAXINE HYDROCHLORIDE 150 MG/1
150 TABLET, EXTENDED RELEASE ORAL DAILY
Qty: 30 TABLET | Refills: 0 | Status: SHIPPED | OUTPATIENT
Start: 2021-09-02 | End: 2021-09-05

## 2021-09-02 RX ORDER — VENLAFAXINE HYDROCHLORIDE 75 MG/1
225 CAPSULE, EXTENDED RELEASE ORAL DAILY
Qty: 90 CAPSULE | Refills: 0 | Status: CANCELLED | OUTPATIENT
Start: 2021-09-02

## 2021-09-02 NOTE — TELEPHONE ENCOUNTER
Call placed to patient    Patient states she was prescribed the Effexor 225mg - patient states that at that dose she was extremely fatigued and drowsy   States she researched side effects and noted that increased fatigue and drowsiness were common side effects    Patient states she did not like being so fatigued and over the past 3 days has been taking Effexor 150mg   States drowsiness has improved    Patient has upcoming appointment with Psychiatry to discuss medication options in October   Has upcoming Physical with Dr. Oswald on 9/21/2021    Patient states she will not have enough pills to get to her appointment with Dr. Oswald   Will need a refill       Sree Boles RN

## 2021-09-02 NOTE — TELEPHONE ENCOUNTER
Patient requesting to speak with RN regarding meds and doses.    Thank  You,    Sharlene Siddiqi, NAHED Carrizales

## 2021-09-05 RX ORDER — VENLAFAXINE HYDROCHLORIDE 150 MG/1
150 TABLET, EXTENDED RELEASE ORAL DAILY
Qty: 30 TABLET | Refills: 0 | Status: SHIPPED | OUTPATIENT
Start: 2021-09-05

## 2021-09-18 ASSESSMENT — PATIENT HEALTH QUESTIONNAIRE - PHQ9: SUM OF ALL RESPONSES TO PHQ QUESTIONS 1-9: 11

## 2021-09-19 ASSESSMENT — PATIENT HEALTH QUESTIONNAIRE - PHQ9: SUM OF ALL RESPONSES TO PHQ QUESTIONS 1-9: 11

## 2021-09-21 ENCOUNTER — OFFICE VISIT (OUTPATIENT)
Dept: FAMILY MEDICINE | Facility: CLINIC | Age: 66
End: 2021-09-21
Payer: COMMERCIAL

## 2021-09-21 VITALS
HEART RATE: 69 BPM | HEIGHT: 66 IN | RESPIRATION RATE: 16 BRPM | TEMPERATURE: 96.6 F | BODY MASS INDEX: 30.53 KG/M2 | WEIGHT: 190 LBS | SYSTOLIC BLOOD PRESSURE: 138 MMHG | DIASTOLIC BLOOD PRESSURE: 84 MMHG | OXYGEN SATURATION: 99 %

## 2021-09-21 DIAGNOSIS — R73.09 ELEVATED GLUCOSE: ICD-10-CM

## 2021-09-21 DIAGNOSIS — Z23 ENCOUNTER FOR IMMUNIZATION: ICD-10-CM

## 2021-09-21 DIAGNOSIS — Z13.220 LIPID SCREENING: ICD-10-CM

## 2021-09-21 DIAGNOSIS — F33.1 MAJOR DEPRESSIVE DISORDER, RECURRENT EPISODE, MODERATE (H): ICD-10-CM

## 2021-09-21 DIAGNOSIS — Z00.00 ENCOUNTER FOR MEDICARE ANNUAL WELLNESS EXAM: Primary | ICD-10-CM

## 2021-09-21 DIAGNOSIS — E66.811 CLASS 1 OBESITY WITHOUT SERIOUS COMORBIDITY WITH BODY MASS INDEX (BMI) OF 31.0 TO 31.9 IN ADULT, UNSPECIFIED OBESITY TYPE: ICD-10-CM

## 2021-09-21 PROCEDURE — G0008 ADMIN INFLUENZA VIRUS VAC: HCPCS | Performed by: NURSE PRACTITIONER

## 2021-09-21 PROCEDURE — G0438 PPPS, INITIAL VISIT: HCPCS | Performed by: NURSE PRACTITIONER

## 2021-09-21 PROCEDURE — 90732 PPSV23 VACC 2 YRS+ SUBQ/IM: CPT | Performed by: NURSE PRACTITIONER

## 2021-09-21 PROCEDURE — 90662 IIV NO PRSV INCREASED AG IM: CPT | Performed by: NURSE PRACTITIONER

## 2021-09-21 PROCEDURE — G0009 ADMIN PNEUMOCOCCAL VACCINE: HCPCS | Performed by: NURSE PRACTITIONER

## 2021-09-21 RX ORDER — SERTRALINE HYDROCHLORIDE 100 MG/1
100 TABLET, FILM COATED ORAL DAILY
Qty: 90 TABLET | Refills: 3 | Status: SHIPPED | OUTPATIENT
Start: 2021-09-21

## 2021-09-21 ASSESSMENT — ENCOUNTER SYMPTOMS
ARTHRALGIAS: 0
CONSTIPATION: 0
EYE PAIN: 0
NAUSEA: 0
NERVOUS/ANXIOUS: 0
HEADACHES: 0
FREQUENCY: 1
WEAKNESS: 0
ABDOMINAL PAIN: 0
HEMATURIA: 0
HEARTBURN: 0
SHORTNESS OF BREATH: 0
DIZZINESS: 0
COUGH: 0
BREAST MASS: 0
SORE THROAT: 0
MYALGIAS: 0
PALPITATIONS: 0
DYSURIA: 0
PARESTHESIAS: 0
CHILLS: 0
DIARRHEA: 0
HEMATOCHEZIA: 0
JOINT SWELLING: 0
FEVER: 0

## 2021-09-21 ASSESSMENT — PATIENT HEALTH QUESTIONNAIRE - PHQ9
SUM OF ALL RESPONSES TO PHQ QUESTIONS 1-9: 11
SUM OF ALL RESPONSES TO PHQ QUESTIONS 1-9: 11
10. IF YOU CHECKED OFF ANY PROBLEMS, HOW DIFFICULT HAVE THESE PROBLEMS MADE IT FOR YOU TO DO YOUR WORK, TAKE CARE OF THINGS AT HOME, OR GET ALONG WITH OTHER PEOPLE: SOMEWHAT DIFFICULT

## 2021-09-21 ASSESSMENT — ACTIVITIES OF DAILY LIVING (ADL): CURRENT_FUNCTION: NO ASSISTANCE NEEDED

## 2021-09-21 ASSESSMENT — MIFFLIN-ST. JEOR: SCORE: 1405.64

## 2021-09-21 NOTE — PROGRESS NOTES
"SUBJECTIVE:   Enid Sexton is a 66 year old female who presents for Preventive Visit.      Patient has been advised of split billing requirements and indicates understanding: Yes   Are you in the first 12 months of your Medicare coverage?  No    Healthy Habits:     In general, how would you rate your overall health?  Good    Frequency of exercise:  6-7 days/week    Duration of exercise:  Greater than 60 minutes    Do you usually eat at least 4 servings of fruit and vegetables a day, include whole grains    & fiber and avoid regularly eating high fat or \"junk\" foods?  No    Taking medications regularly:  No    Medication side effects:  Other    Ability to successfully perform activities of daily living:  No assistance needed    Home Safety:  No safety concerns identified    Hearing Impairment:  No hearing concerns    In the past 6 months, have you been bothered by leaking of urine?  No    In general, how would you rate your overall mental or emotional health?  Poor      PHQ-2 Total Score: 4    Additional concerns today:  Yes    Sleep: sleeps more than should she feels  Nutrition: eats a standard american diet  Depression: was on Lexapro for years; then it stopped working so several months ago switched to Venlafaxine. Has also tried to citalopram/buprion.     Do you feel safe in your environment? Yes    Have you ever done Advance Care Planning? (For example, a Health Directive, POLST, or a discussion with a medical provider or your loved ones about your wishes): No, advance care planning information given to patient to review.  Patient plans to discuss their wishes with loved ones or provider.         Fall risk  Fallen 2 or more times in the past year?: No  Any fall with injury in the past year?: No    Cognitive Screening   1) Repeat 3 items (Leader, Season, Table)    2) Clock draw: NORMAL  3) 3 item recall: Recalls 3 objects  Results: 3 items recalled: COGNITIVE IMPAIRMENT LESS LIKELY    Mini-CogTM Copyright S " Annie. Licensed by the author for use in [facility]; reprinted with permission (dion@[facility]). All rights reserved.      Do you have sleep apnea, excessive snoring or daytime drowsiness?: no    Reviewed and updated as needed this visit by clinical staff  Tobacco  Allergies  Meds   Med Hx  Surg Hx  Fam Hx  Soc Hx        Reviewed and updated as needed this visit by Provider                Social History     Tobacco Use     Smoking status: Former Smoker     Packs/day: 1.00     Years: 30.00     Pack years: 30.00     Types: Cigarettes     Start date: 1970     Quit date: 2008     Years since quittin.0     Smokeless tobacco: Never Used   Substance Use Topics     Alcohol use: Yes     Comment: Occasional beer         Alcohol Use 2021   Prescreen: >3 drinks/day or >7 drinks/week? Yes   Prescreen: >3 drinks/day or >7 drinks/week? -   AUDIT SCORE  4           Depression and Anxiety Follow-Up    How are you doing with your depression since your last visit? No change    How are you doing with your anxiety since your last visit?  No change    Are you having other symptoms that might be associated with depression or anxiety? No    Have you had a significant life event? No     Do you have any concerns with your use of alcohol or other drugs? No    Social History     Tobacco Use     Smoking status: Former Smoker     Packs/day: 1.00     Years: 30.00     Pack years: 30.00     Types: Cigarettes     Start date: 1970     Quit date: 2008     Years since quittin.0     Smokeless tobacco: Never Used   Substance Use Topics     Alcohol use: Yes     Comment: Occasional beer     Drug use: No     PHQ 2021   PHQ-9 Total Score 16 11 11   Q9: Thoughts of better off dead/self-harm past 2 weeks Several days Several days Several days   F/U: Thoughts of suicide or self-harm - No No   F/U: Safety concerns - No No     ANUSHA-7 SCORE 3/26/2019 10/4/2019 3/17/2020   Total Score - - -    Total Score - - 3 (minimal anxiety)   Total Score 1 5 3     Last PHQ-9 9/21/2021   1.  Little interest or pleasure in doing things 2   2.  Feeling down, depressed, or hopeless 2   3.  Trouble falling or staying asleep, or sleeping too much 2   4.  Feeling tired or having little energy 2   5.  Poor appetite or overeating 1   6.  Feeling bad about yourself 1   7.  Trouble concentrating 0   8.  Moving slowly or restless 0   Q9: Thoughts of better off dead/self-harm past 2 weeks 1   PHQ-9 Total Score 11   Difficulty at work, home, or with people -   In the past two weeks have you had thoughts of suicide or self harm? No   Do you have concerns about your personal safety or the safety of others? No     ANUSHA-7  3/17/2020   1. Feeling nervous, anxious, or on edge 1   2. Not being able to stop or control worrying 0   3. Worrying too much about different things 0   4. Trouble relaxing 1   5. Being so restless that it is hard to sit still 0   6. Becoming easily annoyed or irritable 1   7. Feeling afraid, as if something awful might happen 0   ANUSHA-7 Total Score 3   If you checked any problems, how difficult have they made it for you to do your work, take care of things at home, or get along with other people? -       Follow Up Actions Taken  Patient to follow up with PCP.  Clinic staff to schedule appointment if able.       Hypothyroidism Follow-up      Since last visit, patient describes the following symptoms: Weight stable, no hair loss, no skin changes, no constipation, no loose stools      Current providers sharing in care for this patient include:   Patient Care Team:  Nithya Oswald DO as PCP - General (Family Practice)  Nithya Oswald DO as Assigned PCP    The following health maintenance items are reviewed in Epic and correct as of today:  Health Maintenance Due   Topic Date Due     DEXA  Never done     ANNUAL REVIEW OF HM ORDERS  Never done     MAMMO SCREENING  01/10/2021     FALL RISK ASSESSMENT  07/13/2021  "    COLORECTAL CANCER SCREENING  07/14/2021     INFLUENZA VACCINE (1) 09/01/2021     Pneumococcal Vaccine: 65+ Years (2 of 2 - PPSV23) 07/13/2021     Lab work is in process  Pneumonia Vaccine:Adults age 65+ who received Pneumovax (PPSV23) at 65 years or older: Should be given PCV13 > 1 year after their most recent PPSV23    Breast CA Risk Assessment (FHS-7) 9/18/2021 9/21/2021   Do you have a family history of breast, colon, or ovarian cancer? No / Unknown Yes     Mammogram Screening: Recommended mammography every 1-2 years with patient discussion and risk factor consideration  Pertinent mammograms are reviewed under the imaging tab.  Patient declined mammogram    Review of Systems   Constitutional: Negative for chills and fever.   HENT: Negative for congestion, ear pain, hearing loss and sore throat.    Eyes: Negative for pain and visual disturbance.   Respiratory: Negative for cough and shortness of breath.    Cardiovascular: Negative for chest pain, palpitations and peripheral edema.   Gastrointestinal: Negative for abdominal pain, constipation, diarrhea, heartburn, hematochezia and nausea.   Breasts:  Negative for tenderness, breast mass and discharge.   Genitourinary: Positive for frequency. Negative for dysuria, genital sores, hematuria, pelvic pain, urgency, vaginal bleeding and vaginal discharge.   Musculoskeletal: Negative for arthralgias, joint swelling and myalgias.   Skin: Negative for rash.   Neurological: Negative for dizziness, weakness, headaches and paresthesias.   Psychiatric/Behavioral: Positive for mood changes. The patient is not nervous/anxious.        OBJECTIVE:   /84   Pulse 69   Temp (!) 96.6  F (35.9  C)   Resp 16   Ht 1.664 m (5' 5.5\")   Wt 86.2 kg (190 lb)   SpO2 99%   BMI 31.14 kg/m   Estimated body mass index is 31.14 kg/m  as calculated from the following:    Height as of this encounter: 1.664 m (5' 5.5\").    Weight as of this encounter: 86.2 kg (190 lb).  Physical " Exam  General Appearance:  Alert, cooperative, no distress, appears stated age   Head:  Normocephalic, without obvious abnormality, atraumatic   Eyes:  PERRL, conjunctiva/corneas clear, EOM's intact   Ears:  Normal TM's and external ear canals, both ears   Nose: Nares normal, septum midline, mucosa normal, no drainage   Throat: Lips, mucosa, and tongue normal; teeth and gums normal   Neck: Supple, symmetrical, trachea midline, no adenopathy, thyroid: not enlarged, symmetric, no tenderness/mass/nodules   Back:   Symmetric, no curvature, ROM normal, no CVA tenderness   Lungs:   Clear to auscultation bilaterally, respirations unlabored   Chest Wall:  No tenderness or deformity   Heart:  Regular rate and rhythm, S1, S2 normal, no murmur, rub or gallop   Breast: Bilateral breasts without lumps, tenderness, nipple changes or discharge, skin changes or rashes.   Abdomen:   Soft, non-tender, bowel sounds active all four quadrants,  no masses, no organomegaly   Extremities: Extremities normal, atraumatic, no cyanosis or edema   Skin: Skin color, texture, turgor normal, no rashes or lesions   Lymph nodes: Cervical and supraclavicular normal   Neurologic: Normal,Coordinated, smooth gait, balance, rapid alternating movements, sensory functioning, and cranial nerves II-XII grossly intact. DTR's+2 bilaterally brachioradialis, knee, ankle.            ASSESSMENT / PLAN:   1. Encounter for Medicare annual wellness exam  Counseled on healthy diet, exercise physical activity, stress management, alcohol usage, and sleep.  Patient declined mammogram, DEXA scan, colonoscopy she states that she recently gotten a Cologuard done the summer and it was negative.  She states she will fax it or mail the results to the clinic so that we have them in her chart.    2. Major depressive disorder, recurrent episode, moderate (H)  History of depression has taken many different medicationswas on Lexapro for years; then it stopped working so several  "months ago switched to Venlafaxine. Has also tried to citalopram/bupropion. Doesn't feel the venlafaxine is working for her and feels it causes her to be drowsy. Would like to try another type of selective serotonin reuptake inhibitor drug  - sertraline (ZOLOFT) 100 MG tablet; Take 1 tablet (100 mg) by mouth daily  Dispense: 90 tablet; Refill: 3    3. Class 1 obesity without serious comorbidity with body mass index (BMI) of 31.0 to 31.9 in adult, unspecified obesity type  Discussed briefly the importance of a low carbohydrate diet for maintenance of a healthy weight.  Information and resources were given patient can follow-up with Dr. Oswald if she has further questions or needs assistance.  - Lipid panel reflex to direct LDL Fasting; Future    4. Elevated glucose  Had a history of elevated glucose in the past we will just double check with hemoglobin A1c.  - Hemoglobin A1c; Future    5. Lipid screening  - Lipid panel reflex to direct LDL Fasting; Future    6. Encounter for immunization   - INFLUENZA, QUAD, HIGH DOSE, PF, 65YR + (FLUZONE HD)  - PPSV23, IM/SUBQ (2+ YRS) - [id number]      Patient has been advised of split billing requirements and indicates understanding: Yes  COUNSELING:  Reviewed preventive health counseling, as reflected in patient instructions       Regular exercise       Healthy diet/nutrition       Fall risk prevention       Osteoporosis prevention/bone health    Estimated body mass index is 31.14 kg/m  as calculated from the following:    Height as of this encounter: 1.664 m (5' 5.5\").    Weight as of this encounter: 86.2 kg (190 lb).      She reports that she quit smoking about 13 years ago. Her smoking use included cigarettes. She started smoking about 51 years ago. She has a 30.00 pack-year smoking history. She has never used smokeless tobacco.      Appropriate preventive services were discussed with this patient, including applicable screening as appropriate for cardiovascular disease, " diabetes, osteopenia/osteoporosis, and glaucoma.  As appropriate for age/gender, discussed screening for colorectal cancer, prostate cancer, breast cancer, and cervical cancer. Checklist reviewing preventive services available has been given to the patient.    Reviewed patients plan of care and provided an AVS. The Basic Care Plan (routine screening as documented in Health Maintenance) for [place] meets the Care Plan requirement. This Care Plan has been established and reviewed with the Patient.    Counseling Resources:  ATP IV Guidelines  Pooled Cohorts Equation Calculator  Breast Cancer Risk Calculator  Breast Cancer: Medication to Reduce Risk  FRAX Risk Assessment  ICSI Preventive Guidelines  Dietary Guidelines for Americans, 2010  [employer]'s MyPlate  ASA Prophylaxis  Lung CA Screening    GUSTAVO Prasad [facility]    Identified Health Risks:  Answers for HPI/ROS submitted by the patient on 9/21/2021  If you checked off any problems, how difficult have these problems made it for you to do your work, take care of things at home, or get along with other people?: Somewhat difficult  PHQ9 TOTAL SCORE: 11

## 2021-09-21 NOTE — PROGRESS NOTES
The patient s PHQ-9 score is consistent with moderate depression. She was provided with information regarding depression and was advised to schedule a follow up appointment in *** weeks to further address this issue.

## 2021-09-21 NOTE — PATIENT INSTRUCTIONS
"  Patient Education        --Eat real foods that are high in natural fats (meats, Ghee, avocado oil, extra virgin olive oil coconut oil, butter, natural nut butters, nuts, avocados, full fat dairy). Avoid canola, vegetable, corn, soybean, and seed oils.   --Eat real foods that are fermented (Full fat yogurt, Marcelina kraut, kombucha, kimichi, pickles)  --Eat real foods that  are high in fiber (fruits, vegetables). (Berries and green leafy veggies)  --Avoid or eliminate refined processed carbohydrates (foods with added sugar, ice cream, deserts, chips, crackers, bread, tortillas, bagels, pasta, junk food, and fast food).   50-70 grams of carbs/day is a place to start.   --Avoid or eliminate sugar sweetened beverages (pop, sports drinks, sweetened tea/coffee, juice)  --Find low carb substitutes for bread, pasta, deserts (zoodles, fathead pizza, spaghetti squash, Wonder noodles, shirataki noodles, low carb bread bread, cauliflower rice).  --Consider having a fasting period of 12-16 hours every day.  --Walk 30 minutes daily.  --Sleep 7-9 hours of uninterrupted sleep every night.  --Decrease chronic stress, or increase stress reducing activities.    Resources    Documentaries  Fed Up (amazon prime)  Fat Fiction (amazon Prime)    Podcasts  Pursuing Health Episode 150 \"Our Approach to Nutrition\"  The Obesity Code podcast  2Ketodudes  [facility]  Diet doctor podcast    YouTube  Dr Zhen Sharif: Readdressing Dietary Guidelines  Dr Zhen Sharif: What if we are wrong about Diabetes? (Fei Talk)  Dr Paul Cartagena The Aetiolgy of Obesity  Oleksandr Osorio Sleep (Fei Talk)  Dr Shara Botello (Tedx Talk) \"Reversing Type 2 Diabetes Starts with Ignoring the Guidelines\"    Websites  Dietdoctor.com **  ketonutrition.org  Zoeharcombe.com  precisionnutrition.com  Artisoft.Fly me to the Moon    Apps  Carb Manager: Keto diet tracker and macros counter  ItrackBites: diet Tracker and weight loss diary  Calorie, carb & Fat counter  KetoManager: Low Carb " "Diet Tracker, Macro counter  My Fitness Pal    Books:  The Obesity Code by Dr Paul Cartagena  Eat Fat, Get Thin by Dr Marco Nicole  The Case Against Sugar by Black Victor  Why We Get Fat by Black Victor  Always Hungry by Dr Ayo Mohamud  Fat Chance by Dr Rajeev Romero  Keto Clarity by Dr Librado Armendariz  The Art and Science of Low Carbohydrate Living by Jacob Umana and Yordy Matt    Personalized Prevention Plan  You are due for the preventive services outlined below.  Your care team is available to assist you in scheduling these services.  If you have already completed any of these items, please share that information with your care team to update in your medical record.  Health Maintenance Due   Topic Date Due     Osteoporosis Screening  Never done     ANNUAL REVIEW OF HM ORDERS  Never done     Mammogram  01/10/2021     Annual Wellness Visit  07/13/2021     FALL RISK ASSESSMENT  07/13/2021     Colorectal Cancer Screening  07/14/2021     Flu Vaccine (1) 09/01/2021     Pneumococcal Vaccine (2 of 2 - PPSV23) 07/13/2021       Depression and Suicide in Older Adults    Nearly 2 million older Americans have some type of depression. Some of them even take their own lives. Yet depression among older adults is often ignored. Learn the warning signs. You may help spare a loved one needless pain. You may also save a life.   What is depression?  Depression is a common and serious illness that affects the way you think and feel. It is not a normal part of aging, nor is it a sign of weakness, a character flaw, or something you can snap out of. Most people with depression need treatment to get better. The most common symptom is a feeling of deep sadness. People who are depressed also may seem tired and listless. And nothing seems to give them pleasure. It s normal to grieve or be sad sometimes. But sadness lessens or passes with time. Depression rarely goes away or improves on its own. A person with clinical depression can't \"snap " "out of it.\" Other symptoms of depression are:     Sleeping more or less than normal    Eating more or less than normal    Having headaches, stomachaches, or other pains that don t go away    Feeling nervous,  empty,  or worthless    Crying a great deal    Thinking or talking about suicide or death    Loss of interest in activities previously enjoyed    Social isolation    Feeling confused or forgetful  What causes it?  The causes of depression aren t fully known. But it is thought to result from a complex blend of these factors:     Biochemistry. Certain chemicals in the brain play a role.    Genes. Depression does run in families.    Life stress. Life stresses can also trigger depression in some people. Older adults often face many stressors, such as death of friends or a spouse, health problems, and financial concerns.    Chronic conditions. This includes conditions such as diabetes, heart disease, or cancer. These can cause symptoms of depression. Medicine side effects can cause changes in thoughts and behaviors.  How you can help  Often, depressed people may not want to ask for help. When they do, they may be ignored. Or, they may receive the wrong treatment. You can help by showing parents and older friends love and support. If they seem depressed, don t lecture the person, ignore the symptoms, or discount the symptoms as a  normal  part of aging -which they are not. Get involved, listen, and show interest and support.   Help them understand that depression is a treatable illness. Tell them you can help them find the right treatment. Offer to go to their healthcare provider's appointment with them for support when the symptoms are discussed. With their approval, contact a local mental health center, social service agency, or hospital about services.   You can be an advocate for him or her at healthcare appointments. Many older adults have chronic illnesses that can cause symptoms of depression. Medicine side " effects can change thoughts and behaviors. You can help make sure that the healthcare provider looks at all of these factors. He or she should refer your family member or friend to a mental healthcare provider when needed. in some cases, untreated depression can lead to a misdiagnosis. A person may be diagnosed with a brain disorder such as dementia. If the healthcare provider does not take the issue of depression seriously, help your family member or friend to find another provider.   Don't be afraid to ask  If you think an older person you care about could be suicidal, ask,  Have you thought about suicide?  Most people will tell you the truth. If they say  yes,  they may already have a plan for how and when they will attempt it. Find out as much as you can. The more detailed the plan, and the easier it is to carry out, the more danger the person is in right now. Tell the person you are there for them and do not want them to harm him or herself. Don't wait to get help for the person. Call the person's healthcare provider, local hospital, or emergency services.   To learn more    National Suicide Prevention Lifeline (crisis hotline) 411-706-IACZ (438-666-8447)    National [place] of Mental Hgclcd021-550-4933slt.[facility] Hospitalh.nih.gov    National [place] on Mental Qjjvlry109-612-4602cee.art.org    Mental Health Uuxazke121-423-4310znf.[facility].org    National Suicide Onynypy176-RHYFHRS (616-394-3015)    Call 911  Never leave the person alone. A person who is actively suicidal needs psychiatric care right away. They will need constant supervision. Never leave the person out of sight. Call 911 or the national 24-hour suicide crisis hotline at 975-917-OUNW (665-342-9212). You can also take the person to the closest emergency room.   [employer] last reviewed this educational content on 5/1/2020 2000-2021 [employer]. All rights reserved. This information is not intended as a substitute for professional medical care.  Always follow your healthcare professional's instructions.

## 2021-09-22 NOTE — ASSESSMENT & PLAN NOTE
History of depression has taken many different medicationswas on Lexapro for years; then it stopped working so several months ago switched to Venlafaxine. Has also tried to citalopram/bupropion. Doesn't feel the venlafaxine is working for her and feels it causes her to be drowsy. Would like to try another type of selective serotonin reuptake inhibitor drug.  She would also like to consider genetic testing for antidepressant therapy.  - sertraline (ZOLOFT) 100 MG tablet; Take 1 tablet (100 mg) by mouth daily  Dispense: 90 tablet; Refill: 3

## 2021-11-24 DIAGNOSIS — E03.4 HYPOTHYROIDISM DUE TO ACQUIRED ATROPHY OF THYROID: ICD-10-CM

## 2021-11-26 RX ORDER — LEVOTHYROXINE SODIUM 88 UG/1
TABLET ORAL
Qty: 90 TABLET | Refills: 1 | Status: SHIPPED | OUTPATIENT
Start: 2021-11-26

## 2022-09-03 ENCOUNTER — HEALTH MAINTENANCE LETTER (OUTPATIENT)
Age: 67
End: 2022-09-03

## 2023-02-08 NOTE — TELEPHONE ENCOUNTER
"Requested Prescriptions   Pending Prescriptions Disp Refills     levothyroxine (SYNTHROID/LEVOTHROID) 88 MCG tablet 90 tablet 3    Last Written Prescription Date:  03/22/2018 #90 x 3  Last filled 11/23/2018  Last office visit: 10/23/2018 WANDY Oswald   Future Office Visit: None    Sig: Take 1 tablet (88 mcg) by mouth daily    Thyroid Protocol Passed - 2/18/2019  4:30 PM       Passed - Patient is 12 years or older       Passed - Recent (12 mo) or future (30 days) visit within the authorizing provider's specialty    Patient had office visit in the last 12 months or has a visit in the next 30 days with authorizing provider or within the authorizing provider's specialty.  See \"Patient Info\" tab in inbasket, or \"Choose Columns\" in Meds & Orders section of the refill encounter.             Passed - Medication is active on med list       Passed - Normal TSH on file in past 12 months    Recent Labs   Lab Test 03/22/18  1209   TSH 1.32             Passed - No active pregnancy on record    If patient is pregnant or has had a positive pregnancy test, please check TSH.         Passed - No positive pregnancy test in past 12 months    If patient is pregnant or has had a positive pregnancy test, please check TSH.            " unknown

## 2023-04-23 ENCOUNTER — HEALTH MAINTENANCE LETTER (OUTPATIENT)
Age: 68
End: 2023-04-23